# Patient Record
Sex: FEMALE | Race: BLACK OR AFRICAN AMERICAN | Employment: UNEMPLOYED
[De-identification: names, ages, dates, MRNs, and addresses within clinical notes are randomized per-mention and may not be internally consistent; named-entity substitution may affect disease eponyms.]

---

## 2017-09-24 ENCOUNTER — HEALTH MAINTENANCE LETTER (OUTPATIENT)
Age: 27
End: 2017-09-24

## 2018-10-01 ENCOUNTER — HEALTH MAINTENANCE LETTER (OUTPATIENT)
Age: 28
End: 2018-10-01

## 2021-08-10 ENCOUNTER — OFFICE VISIT (OUTPATIENT)
Dept: OBGYN | Facility: CLINIC | Age: 31
End: 2021-08-10
Payer: COMMERCIAL

## 2021-08-10 VITALS
SYSTOLIC BLOOD PRESSURE: 136 MMHG | OXYGEN SATURATION: 96 % | BODY MASS INDEX: 33.83 KG/M2 | WEIGHT: 191 LBS | HEART RATE: 85 BPM | DIASTOLIC BLOOD PRESSURE: 92 MMHG

## 2021-08-10 DIAGNOSIS — E28.2 PCOS (POLYCYSTIC OVARIAN SYNDROME): ICD-10-CM

## 2021-08-10 DIAGNOSIS — R10.2 PELVIC PAIN IN FEMALE: ICD-10-CM

## 2021-08-10 DIAGNOSIS — R30.0 DYSURIA: Primary | ICD-10-CM

## 2021-08-10 DIAGNOSIS — Z31.41 FERTILITY TESTING: ICD-10-CM

## 2021-08-10 DIAGNOSIS — N94.6 DYSMENORRHEA: ICD-10-CM

## 2021-08-10 LAB
ALBUMIN UR-MCNC: NEGATIVE MG/DL
APPEARANCE UR: CLEAR
BACTERIA #/AREA URNS HPF: ABNORMAL /HPF
BILIRUB UR QL STRIP: NEGATIVE
COLOR UR AUTO: YELLOW
ESTRADIOL SERPL-MCNC: 37 PG/ML
FSH SERPL-ACNC: 7.3 IU/L
GLUCOSE UR STRIP-MCNC: NEGATIVE MG/DL
HGB UR QL STRIP: ABNORMAL
KETONES UR STRIP-MCNC: NEGATIVE MG/DL
LEUKOCYTE ESTERASE UR QL STRIP: NEGATIVE
LH SERPL-ACNC: 21.3 IU/L
NITRATE UR QL: NEGATIVE
PH UR STRIP: 6 [PH] (ref 5–7)
PROLACTIN SERPL-MCNC: 34 UG/L (ref 3–27)
RBC #/AREA URNS AUTO: ABNORMAL /HPF
SP GR UR STRIP: 1.02 (ref 1–1.03)
SQUAMOUS #/AREA URNS AUTO: ABNORMAL /LPF
TSH SERPL DL<=0.005 MIU/L-ACNC: 0.66 MU/L (ref 0.4–4)
UROBILINOGEN UR STRIP-ACNC: 0.2 E.U./DL
WBC #/AREA URNS AUTO: ABNORMAL /HPF

## 2021-08-10 PROCEDURE — 83002 ASSAY OF GONADOTROPIN (LH): CPT | Performed by: OBSTETRICS & GYNECOLOGY

## 2021-08-10 PROCEDURE — 84443 ASSAY THYROID STIM HORMONE: CPT | Performed by: OBSTETRICS & GYNECOLOGY

## 2021-08-10 PROCEDURE — 87086 URINE CULTURE/COLONY COUNT: CPT | Performed by: OBSTETRICS & GYNECOLOGY

## 2021-08-10 PROCEDURE — 81001 URINALYSIS AUTO W/SCOPE: CPT | Performed by: OBSTETRICS & GYNECOLOGY

## 2021-08-10 PROCEDURE — 83001 ASSAY OF GONADOTROPIN (FSH): CPT | Performed by: OBSTETRICS & GYNECOLOGY

## 2021-08-10 PROCEDURE — 87186 SC STD MICRODIL/AGAR DIL: CPT | Performed by: OBSTETRICS & GYNECOLOGY

## 2021-08-10 PROCEDURE — 36415 COLL VENOUS BLD VENIPUNCTURE: CPT | Performed by: OBSTETRICS & GYNECOLOGY

## 2021-08-10 PROCEDURE — 84146 ASSAY OF PROLACTIN: CPT | Performed by: OBSTETRICS & GYNECOLOGY

## 2021-08-10 PROCEDURE — 82670 ASSAY OF TOTAL ESTRADIOL: CPT | Performed by: OBSTETRICS & GYNECOLOGY

## 2021-08-10 PROCEDURE — 99203 OFFICE O/P NEW LOW 30 MIN: CPT | Performed by: OBSTETRICS & GYNECOLOGY

## 2021-08-10 RX ORDER — PHENAZOPYRIDINE HYDROCHLORIDE 200 MG/1
200 TABLET, FILM COATED ORAL 3 TIMES DAILY PRN
Qty: 9 TABLET | Refills: 0 | Status: SHIPPED | OUTPATIENT
Start: 2021-08-10 | End: 2021-09-08

## 2021-08-10 RX ORDER — NITROFURANTOIN 25; 75 MG/1; MG/1
100 CAPSULE ORAL 2 TIMES DAILY
Qty: 14 CAPSULE | Refills: 0 | Status: SHIPPED | OUTPATIENT
Start: 2021-08-10 | End: 2021-09-08

## 2021-08-10 NOTE — PATIENT INSTRUCTIONS
If you have any questions regarding your visit, Please contact your care team.     [employer]: [phone]  [facility] CLINIC HOURS TELEPHONE NUMBER       Manuel Zuñiga M.D.    Ivana-MELISSA Sun-MELISSA Garcia-Medical Assistant    Radha-  Analilia-     Monday-[place]  8:00a.m-4:45 p.m  Tuesday-[place]  9:00a.m-4:00 p.m  Wednesday-[place] 8:00a.m-4:45 p.m.  Thursday-[place]  8:00a.m-4:45 p.m.  Friday-[place]  8:00a.m-4:45 p.m. [facility]  [address] [id number] [phone] ask for [facility] [place]  [phone] Fax  Imaging [phone]    [facility] Labor [facility] Delivery  [address]  [place], [place] [address] [phone]    [facility]  [address] [place]  [place] [address] [phone] ask [facility]  [phone] Fax  Imaging [phone]     Urgent Care locations:    [place]        [place] Monday-Friday  5 pm - 9 pm  Saturday and Sunday   9 am - 5 pm  Monday-Friday   11 am - 9 pm  Saturday and Sunday   9 am - 5 pm   [phone]   If you need a medication refill, please contact your pharmacy. Please allow 3 business days for your refill to be completed.  As always, Thank you for trusting us with your healthcare needs!

## 2021-08-13 LAB
BACTERIA UR CULT: ABNORMAL
BACTERIA UR CULT: ABNORMAL

## 2021-08-13 NOTE — PROGRESS NOTES
OB-GYN Problem-Oriented Visit or Consultation      Ifeoma Mahoney is a 31 year old year old P 0 who presents with a chief complaint of dysmenorrhea and possible UTI and fertility concerns.  Referred by self.  Patient's last menstrual period was 07/26/2021 (exact date).    Assessment:   Encounter Diagnoses   Name Primary?     Dysuria Yes     PCOS (polycystic ovarian syndrome)      Dysmenorrhea      Pelvic pain in female      Fertility testing          Plan and Recommendations: Begin empiric treatment for UTI though UA borderline. UC pending. Add Pyridium. Check pelvic ultrasound. D3 hormonal profile and later semen analysis and possible repeat HSG.   I reviewed the condition, causes, differential diagnosis, prognosis, evaluation and management considerations and options.  Questions answered and information given. See orders.   30 minutes spent on the date of encounter doing chart review, history, discussion with patient, and documentation, and further activities as noted, and review of appropriateness of decision-making for care, and review of test results, and interpretation of test results.       A/P:  Ifeoma was seen today for fertility.    Diagnoses and all orders for this visit:    Dysuria  -     UA with Microscopic reflex to Culture - lab collect; Future  -     UA with Microscopic reflex to Culture - lab collect  -     Urine Microscopic  -     nitroFURantoin macrocrystal-monohydrate (MACROBID) 100 MG capsule; Take 1 capsule (100 mg) by mouth 2 times daily  -     phenazopyridine (PYRIDIUM) 200 MG tablet; Take 1 tablet (200 mg) by mouth 3 times daily as needed for irritation  -     Urine Culture Aerobic Bacterial - lab collect; Future  -     Urine Culture Aerobic Bacterial - lab collect    PCOS (polycystic ovarian syndrome)  -     US Pelvic Complete with Transvaginal; Future    Dysmenorrhea  -     US Pelvic Complete with Transvaginal; Future    Pelvic pain in female  -     US Pelvic Complete with Transvaginal;  Future  -     Urine Culture Aerobic Bacterial - lab collect; Future  -     Urine Culture Aerobic Bacterial - lab collect    Fertility testing  -     Follicle stimulating hormone; Future  -     Lutropin; Future  -     Estradiol; Future  -     Prolactin; Future  -     TSH; Future  -     Follicle stimulating hormone  -     Lutropin  -     Estradiol  -     Prolactin  -     TSH        Manuel Zuñiga MD    HPI:     See prior notes. Used Clomid x 1 cycle then stopped infertility treatment and used OC until 1.5 yrs ago. Multiple ED visits including one possible recurrent PID. Menses q 28-30 days x 4-5 days. Attempting conception again. Last sex 1 week ago without dyspareunia. History of PCOS.     History of primary dysmenorrhea and has used Aleve or Tylenol with limited relief. LMP 7/26.    Had UTI treated 1 months ago at [facility]. Now has dysuria, suprapubic pain, cloudy strong urine. No fever.     Past medical, obstetrical, surgical, family and social history reviewed and as noted or updated in chart.     Allergies, meds and supplements are as noted or updated in chart.      ROS:   Systems reviewed include:  constitutional, gastrointestinal, genitourinary, musculoskeletal, integumentary, psychological, hematologic/lymphatic and endocrine.    These systems were negative for significant symptoms except for the following additional: none; see HPI.    EXAM:  VS as noted. BP (!) 136/92 (BP Location: Left arm, Patient Position: Chair, Cuff Size: Adult Regular)   Pulse 85   Wt 86.6 kg (191 lb)   LMP 07/26/2021 (Exact Date)   SpO2 96%   Breastfeeding No   BMI 33.83 kg/m    Constitutionally normal.     Exam not repeated at this time.    Manuel Zuñiga MD

## 2021-08-17 ENCOUNTER — TELEPHONE (OUTPATIENT)
Dept: OBGYN | Facility: CLINIC | Age: 31
End: 2021-08-17

## 2021-08-17 NOTE — TELEPHONE ENCOUNTER
Unable to reach patient via phone. Left message to call clinic back at [phone].    Corinne Pal RN

## 2021-08-17 NOTE — TELEPHONE ENCOUNTER
RN took call back from pt and relayed providers advisement. Pt took last dose of macrobid today.    Pt states her pain is increasing and asked about going to the ED. RN advised if her pain is unmanageable at home then she should be seen in the ED for further evaluation.    Patient verbalized understanding and agreed to plan.     Patient was given the opportunity to ask additional questions and have them answered. Patient had no further questions.     Ivana Toney RN on 8/17/2021 at 12:21 PM

## 2021-08-17 NOTE — TELEPHONE ENCOUNTER
[facility] Call Center    Phone Message    May a detailed message be left on voicemail: yes     Reason for Call: Patient called stating that she was speaking to a nurse yesterday regarding her pain and that they were supposed to send over an Rx to the pharmacy. Patient hasn't heard anything regarding this since. Please advise. Thank you.    Action Taken: Message routed to:  [facility] p [phone]    Travel Screening: Not Applicable

## 2021-08-17 NOTE — TELEPHONE ENCOUNTER
The Pyridium is only a short term treatment for dysuria with UTI. Advise completing the Macrobid for the UTI. If on-going symptoms and pain, then advise follow-up with FP/IM to recheck this or other causes for pain. If on-going dysuria despite clearing the UTI, then Urology consult may be needed to rule out stone, etc.      For dysmenorrhea specifically, need to wait to see what the pelvic ultrasound shows.      Please notify.  Manuel Zuñiga MD

## 2021-08-31 ENCOUNTER — PRENATAL OFFICE VISIT (OUTPATIENT)
Dept: OBGYN | Facility: CLINIC | Age: 31
End: 2021-08-31
Payer: COMMERCIAL

## 2021-08-31 DIAGNOSIS — Z23 NEED FOR TDAP VACCINATION: ICD-10-CM

## 2021-08-31 DIAGNOSIS — Z53.9 NO SHOW: Primary | ICD-10-CM

## 2021-08-31 RX ORDER — METRONIDAZOLE 500 MG/1
TABLET ORAL
COMMUNITY
Start: 2021-06-07 | End: 2021-09-08

## 2021-08-31 RX ORDER — PRENATAL VIT/IRON FUM/FOLIC AC 27MG-0.8MG
1 TABLET ORAL DAILY
COMMUNITY
End: 2022-02-24

## 2021-08-31 RX ORDER — FLUCONAZOLE 150 MG/1
TABLET ORAL
COMMUNITY
Start: 2021-06-07 | End: 2021-09-08

## 2021-08-31 NOTE — PROGRESS NOTES
RN called pt, pt then states she needed to take a call during the appt. RN called back 15 minutes later, pt asked to call back in 20 minutes. RN waited and called back in 20 minutes, pt did not answer. RN left message to return call to clinic to reschedule her appt.    Ivana Toney RN on 8/31/2021 at 3:40 PM    This patient was a no show for this scheduled appointment.

## 2021-09-07 ENCOUNTER — TELEPHONE (OUTPATIENT)
Dept: OBGYN | Facility: CLINIC | Age: 31
End: 2021-09-07

## 2021-09-07 NOTE — TELEPHONE ENCOUNTER
[facility] Call Center    Phone Message    May a detailed message be left on voicemail: yes     Reason for Call: Pt said that she has to see Dr. Zuñiga today or tomorrow.  He doesn't have any availability.  Please call Pt back to discuss.  Thanks.    Action Taken: Message routed to:  [facility] p [phone]    Travel Screening: Not Applicable

## 2021-09-07 NOTE — TELEPHONE ENCOUNTER
Spoke to patient, on day 2 of her cycle as of today, was having positive home pregnancy tests, now are negative. Wondering if she can have Clomid restarted again or if she will need to be seen again for an office visit. Please review and advise.  Ultrasound is scheduled for tomorrow as well    Leigh Momin MA

## 2021-09-08 ENCOUNTER — ANCILLARY PROCEDURE (OUTPATIENT)
Dept: ULTRASOUND IMAGING | Facility: CLINIC | Age: 31
End: 2021-09-08
Attending: OBSTETRICS & GYNECOLOGY
Payer: COMMERCIAL

## 2021-09-08 DIAGNOSIS — E28.2 PCOS (POLYCYSTIC OVARIAN SYNDROME): ICD-10-CM

## 2021-09-08 DIAGNOSIS — N94.6 DYSMENORRHEA: ICD-10-CM

## 2021-09-08 DIAGNOSIS — R10.2 PELVIC PAIN IN FEMALE: ICD-10-CM

## 2021-09-08 PROCEDURE — 76856 US EXAM PELVIC COMPLETE: CPT | Mod: 59 | Performed by: RADIOLOGY

## 2021-09-08 PROCEDURE — 76830 TRANSVAGINAL US NON-OB: CPT | Performed by: RADIOLOGY

## 2021-09-08 NOTE — TELEPHONE ENCOUNTER
Recommend awaiting pelvic ultrasound this cycle first before using clomiphene and advise semen analysis also. May require HSG. Please notify.   Manuel Zuñiga MD

## 2021-09-11 ENCOUNTER — OFFICE VISIT (OUTPATIENT)
Dept: URGENT CARE | Facility: URGENT CARE | Age: 31
End: 2021-09-11
Payer: COMMERCIAL

## 2021-09-11 ENCOUNTER — NURSE TRIAGE (OUTPATIENT)
Dept: NURSING | Facility: CLINIC | Age: 31
End: 2021-09-11

## 2021-09-11 VITALS
BODY MASS INDEX: 34.1 KG/M2 | TEMPERATURE: 98.7 F | SYSTOLIC BLOOD PRESSURE: 114 MMHG | DIASTOLIC BLOOD PRESSURE: 81 MMHG | HEART RATE: 87 BPM | OXYGEN SATURATION: 100 % | RESPIRATION RATE: 16 BRPM | WEIGHT: 192.5 LBS

## 2021-09-11 DIAGNOSIS — M79.641 PAIN OF RIGHT HAND: Primary | ICD-10-CM

## 2021-09-11 PROCEDURE — 99203 OFFICE O/P NEW LOW 30 MIN: CPT | Performed by: FAMILY MEDICINE

## 2021-09-11 RX ORDER — NAPROXEN 500 MG/1
500 TABLET ORAL 2 TIMES DAILY WITH MEALS
Qty: 30 TABLET | Refills: 0 | Status: SHIPPED | OUTPATIENT
Start: 2021-09-11 | End: 2021-09-27

## 2021-09-11 NOTE — TELEPHONE ENCOUNTER
Triage call   Patient called to report woke up with paain in  rt hand.  Hard to describe feels  Tight and numb. Pain constant stabbing pain.  She has been running water over it and it makes it feel a little better.  It woke her out of her sleep and has not gotten better.     Per protocol go to office now upgraded to urgent care Care advice given.  Verbalizes understanding and agrees with plan.    Salima Wright RN   [facility] Nurse Advisor  12:35 PM 9/11/2021    COVID 19 Nurse Triage Plan/Patient Instructions    Please be aware that novel coronavirus (COVID-19) may be circulating in the community. If you develop symptoms such as fever, cough, or SOB or if you have concerns about the presence of another infection including coronavirus (COVID-19), please contact your health care provider or visit [email or web address].     Disposition/Instructions    In-Person Visit with provider recommended. Reference Visit Selection Guide.    Thank you for taking steps to prevent the spread of this virus.  o Limit your contact with others.  o Wear a simple mask to cover your cough.  o Wash your hands well and often.    Resources    [facility]: About COVID-19: [email or web address]    CDC: What to Do If You're Sick: www.cdc.gov/coronavirus/2019-ncov/about/steps-when-sick.html    CDC: Ending Home Isolation: www.cdc.gov/coronavirus/2019-ncov/hcp/disposition-in-home-patients.html     CDC: Caring for Someone: www.cdc.gov/coronavirus/2019-ncov/if-you-are-sick/care-for-someone.html     [facility]: Interim Guidance for Hospital Discharge to Home: www.health.Pending sale to [facility].[place]/diseases/coronavirus/hcp/hospdischarge.pdf    [facility] clinical trials (COVID-19 research studies): [email or web address]/umn-[email or web address]-trials     Below are the COVID-19 hotlines at the [place] Department of Health ([facility]). Interpreters are available.   o For health questions: Call 047-397-4106 or 1-834.563.3230 (7 a.m. to 7  p.m.)  o For questions about schools and childcare: Call 036-630-3687 or 1-281.500.5782 (7 a.m. to 7 p.m.)                     Reason for Disposition    SEVERE pain (e.g., excruciating, unable to use hand at all)    Additional Information    Negative: Similar pain previously and it was from 'heart attack'    Negative: Similar pain previously from 'angina' and not relieved by nitroglycerin    Negative: Sounds like a life-threatening emergency to the triager    Negative: Fever and red area (or area very tender to touch)    Negative: Fever and swollen joint    Negative: Patient sounds very sick or weak to the triager    Protocols used: HAND AND WRIST PAIN-A-[place]

## 2021-09-11 NOTE — LETTER
September 11, 2021      Ifeoma Mahoney  [address] [address]        To Whom It May Concern:    Ifeoma Mahoney was seen in our clinic.  Kindly excuse her work absence for today and tomorrow.      Sincerely,        Zhang Correa MD

## 2021-09-11 NOTE — PROGRESS NOTES
Assessment & Plan     Pain of right hand  Differential discussed in detail including right hand sprain, carpal tunnel syndrome.  Patient deferred x-ray.  Suggested rest, icing, Tylenol and naproxen prescribed.  Wrist brace applied for support and stability.  Orthopedic referral placed.  All questions answered.  - naproxen (NAPROSYN) 500 MG tablet; Take 1 tablet (500 mg) by mouth 2 times daily (with meals)  - Orthopedic  Referral; Future         Tobacco Cessation:   reports that she has been smoking cigarettes. She has been smoking about 0.10 packs per day. She has never used smokeless tobacco.  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      Objective    /81 (BP Location: Left arm, Patient Position: Sitting, Cuff Size: Adult Large)   Pulse 87   Temp 98.7  F (37.1  C) (Oral)   Resp 16   Wt 87.3 kg (192 lb 8 oz)   LMP 07/26/2021 (Exact Date)   SpO2 100%   Breastfeeding No   BMI 34.10 kg/m    Body mass index is 34.1 kg/m .  Physical Exam   GENERAL: alert and no distress  NECK: no adenopathy, no asymmetry, masses, or scars and thyroid normal to palpation  RESP: lungs clear to auscultation - no rales, rhonchi or wheezes  CV: regular rate and rhythm, normal S1 S2, no S3 or S4, no murmur, click or rub, no peripheral edema and peripheral  pulses strong  MS: Mildly tender right wrist and dorsal hand, no joint swelling, skin discoloration noted, mildly weak handgrip, normal capillary refill, pedal pulses 3+, sensation to touch and pressure intact  NEURO: Normal strength and tone, mentation intact and speech normal  PSYCH: mentation appears normal, affect normal/bright

## 2021-09-14 ENCOUNTER — OFFICE VISIT (OUTPATIENT)
Dept: ORTHOPEDICS | Facility: CLINIC | Age: 31
End: 2021-09-14
Payer: COMMERCIAL

## 2021-09-14 VITALS — SYSTOLIC BLOOD PRESSURE: 122 MMHG | HEART RATE: 86 BPM | OXYGEN SATURATION: 100 % | DIASTOLIC BLOOD PRESSURE: 77 MMHG

## 2021-09-14 DIAGNOSIS — G56.01 CARPAL TUNNEL SYNDROME ON RIGHT: Primary | ICD-10-CM

## 2021-09-14 DIAGNOSIS — M79.641 PAIN OF RIGHT HAND: ICD-10-CM

## 2021-09-14 PROCEDURE — 99203 OFFICE O/P NEW LOW 30 MIN: CPT | Performed by: ORTHOPAEDIC SURGERY

## 2021-09-14 RX ORDER — METHYLPREDNISOLONE 4 MG
TABLET, DOSE PACK ORAL
Qty: 21 TABLET | Refills: 0 | Status: SHIPPED | OUTPATIENT
Start: 2021-09-14 | End: 2021-09-27

## 2021-09-14 RX ORDER — GABAPENTIN 300 MG/1
300 CAPSULE ORAL
Qty: 30 CAPSULE | Refills: 0 | Status: SHIPPED | OUTPATIENT
Start: 2021-09-14 | End: 2021-09-28

## 2021-09-14 ASSESSMENT — PAIN SCALES - GENERAL: PAINLEVEL: SEVERE PAIN (6)

## 2021-09-14 NOTE — PROGRESS NOTES
SUBJECTIVE:   Ifeoma Mahoney is a 31 year old female who is seen in consultation at the request of Dr. Correa for evaluation of right  hand pain that has been present approximately 1 week. No known injury.    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    Symptoms: pain palm.  Has numbness and tingling inRIGHT  #3 and #4 digits.  Other symptoms include pain driving, difficulty gripping steering wheel. Hand is very sensitive.  No neck problems.   Doesn't want anyone to touch the hand.      Also reports sensitive dorsum LEFT hand when anything rubs on it x 1 week.      Treatment: wrist brace no help. Nsaids,     Job description:  now.     Past Medical History:   Diagnosis Date     Asthma, chronic 1995     Chlamydia 2003     Dysmenorrhea 2002    chronic      Impaired glucose tolerance 2009     Infertility, female 2012     Intermittent asthma 5/24/2013     PCOS (polycystic ovarian syndrome)      PID (acute pelvic inflammatory disease) 2010 2012 HSG showed bilateral patent tubes     Tobacco use disorder       Past Surgical History:   Procedure Laterality Date     DILATION AND CURETTAGE  9/4/2012    benign     LAPAROSCOPY DIAGNOSTIC (GYN)  9/4/2012    neg        REVIEW OF SYSTEMS:  CONSTITUTIONAL:  NEGATIVE for fever, chills, change in weight  INTEGUMENTARY/SKIN:  NEGATIVE for worrisome rashes, moles or lesions  EYES:  NEGATIVE for vision changes or irritation  ENT/MOUTH:  NEGATIVE for ear, mouth and throat problems  RESP:  NEGATIVE for significant cough or SOB  BREAST:  NEGATIVE for masses, tenderness or discharge  CV:  NEGATIVE for chest pain, palpitations or peripheral edema  GI:  NEGATIVE for nausea, abdominal pain, heartburn, or change in bowel habits  :   Negative   MUSCULOSKELETAL:  See HPI above  NEURO:  See HPI above  ENDOCRINE:  NEGATIVE for temperature intolerance, skin/hair changes  HEME/ALLERGY/IMMUNE:  NEGATIVE for bleeding problems  PSYCHIATRIC:  NEGATIVE for changes in mood or affect    EXAM:  GENERAL APPEARANCE: healthy, alert and no distress   GAIT: NORMAL  PSYCH:  mentation appears normal and affect normal/bright  SKIN: no suspicious lesions or rashes  NEURO: reflexes normal in upper extremities.   Vascular: Good capp refill and pulses  RESP: No increased work of breathing  LYMPH:  No lymphedema    MSK/Neuro:   strength: decreased,   negative thenar fasciculations.  Thenar atrophy:none.   Sensation diminished in  @2,3,4 right  digits,     Range of Motion wrist, digits: All Normal  Special tests: Tinel's positive, Phalen's positive.  + tinels over the dorsum of hand at base of 3rd finger    EMG: none.    ASSESSMENT/PLAN  Right, acute Carpal tunnel syndrome   Also some neuritis on the dorsum of the left hand.    We talked about the options, which included  EMG, activity modification, night splinting, therapy, corticosteroid injection, medrol dose pack, and carpal tunnel release.      Since the symptoms are acute, we decided to proceed with oral steroids. A medrol dose pack was ordered..     She was very worried about pain relief. Gabapentin prescription written as well 300mg at bedtime. Refills or dose modification per her primary care provider.    She would also like an appointment for an image guided corticosteroid injection as well in the right carpal tunnel.  This was ordered.  She is desperate for a quick fix for the present problem.     Return to clinic as needed     MARTHA Guzman MD  Dept. Orthopedic Surgery  [facility]

## 2021-09-14 NOTE — NURSING NOTE
Ready to quit: No  Counseling given: Yes  Comment: smokes 7 cigarettes per day, interested in quitting    Miguel Ángel THOMAS

## 2021-09-14 NOTE — LETTER
9/14/2021         RE: Ifeoma Mahoney  [address] [address]        Dear Colleague,    Thank you for referring your patient, Ifeoma Mahoney, to the [facility]. Please see a copy of my visit note below.      SUBJECTIVE:   Ifeoma Mahoney is a 31 year old female who is seen in consultation at the request of Dr. Correa for evaluation of right  hand pain that has been present approximately 1 week. No known injury.    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    Symptoms: pain palm.  Has numbness and tingling inRIGHT  #3 and #4 digits.  Other symptoms include pain driving, difficulty gripping steering wheel. Hand is very sensitive.  No neck problems.   Doesn't want anyone to touch the hand.      Also reports sensitive dorsum LEFT hand when anything rubs on it x 1 week.      Treatment: wrist brace no help. Nsaids,     Job description:  now.     Past Medical History:   Diagnosis Date     Asthma, chronic 1995     Chlamydia 2003     Dysmenorrhea 2002    chronic      Impaired glucose tolerance 2009     Infertility, female 2012     Intermittent asthma 5/24/2013     PCOS (polycystic ovarian syndrome)      PID (acute pelvic inflammatory disease) 2010 2012 HSG showed bilateral patent tubes     Tobacco use disorder       Past Surgical History:   Procedure Laterality Date     DILATION AND CURETTAGE  9/4/2012    benign     LAPAROSCOPY DIAGNOSTIC (GYN)  9/4/2012    neg        REVIEW OF SYSTEMS:  CONSTITUTIONAL:  NEGATIVE for fever, chills, change in weight  INTEGUMENTARY/SKIN:  NEGATIVE for worrisome rashes, moles or lesions  EYES:  NEGATIVE for vision changes or irritation  ENT/MOUTH:  NEGATIVE for ear, mouth and throat  problems  RESP:  NEGATIVE for significant cough or SOB  BREAST:  NEGATIVE for masses, tenderness or discharge  CV:  NEGATIVE for chest pain, palpitations or peripheral edema  GI:  NEGATIVE for nausea, abdominal pain, heartburn, or change in bowel habits  :  Negative   MUSCULOSKELETAL:  See HPI above  NEURO:  See HPI above  ENDOCRINE:  NEGATIVE for temperature intolerance, skin/hair changes  HEME/ALLERGY/IMMUNE:  NEGATIVE for bleeding problems  PSYCHIATRIC:  NEGATIVE for changes in mood or affect    EXAM:  GENERAL APPEARANCE: healthy, alert and no distress   GAIT: NORMAL  PSYCH:  mentation appears normal and affect normal/bright  SKIN: no suspicious lesions or rashes  NEURO: reflexes normal in upper extremities.   Vascular: Good capp refill and pulses  RESP: No increased work of breathing  LYMPH:  No lymphedema    MSK/Neuro:   strength: decreased,   negative thenar fasciculations.  Thenar atrophy:none.   Sensation diminished in  @2,3,4 right  digits,     Range of Motion wrist, digits: All Normal  Special tests: Tinel's positive, Phalen's positive.  + tinels over the dorsum of hand at base of 3rd finger    EMG: none.    ASSESSMENT/PLAN  Right, acute Carpal tunnel syndrome   Also some neuritis on the dorsum of the left hand.    We talked about the options, which included  EMG, activity modification, night splinting, therapy, corticosteroid injection, medrol dose pack, and carpal tunnel release.      Since the symptoms are acute, we decided to proceed with oral steroids. A medrol dose pack was ordered..     She was very worried about pain relief. Gabapentin prescription written as well 300mg at bedtime. Refills or dose modification per her primary care provider.    She would also like an appointment for an image guided corticosteroid injection as well in the right carpal tunnel.  This was ordered.  She is desperate for a quick fix for the present problem.     Return to clinic as needed     MARTHA Guzman  MD  Dept. Orthopedic Surgery  [facility]      Again, thank you for allowing me to participate in the care of your patient.        Sincerely,        Derek Guzman MD

## 2021-09-16 ENCOUNTER — NURSE TRIAGE (OUTPATIENT)
Dept: FAMILY MEDICINE | Facility: CLINIC | Age: 31
End: 2021-09-16

## 2021-09-16 NOTE — TELEPHONE ENCOUNTER
"  Reason for Disposition    Side (flank) or lower back pain present    SEVERE pain with urination    Additional Information    Negative: Unable to urinate (or only a few drops) and bladder feels very full    Negative: Vomiting    Negative: Patient sounds very sick or weak to the triager    Answer Assessment - Initial Assessment Questions  1. SEVERITY: \"How bad is the pain?\"  (e.g., Scale 1-10; mild, moderate, or severe)    - MILD (1-3): complains slightly about urination hurting    - MODERATE (4-7): interferes with normal activities      - SEVERE (8-10): excruciating, unwilling or unable to urinate because of the pain       Moderate-severe  2. FREQUENCY: \"How many times have you had painful urination today?\"       6 times an hour   3. PATTERN: \"Is pain present every time you urinate or just sometimes?\"       Constant   4. ONSET: \"When did the painful urination start?\"       1 month ago, received treatment but symptoms did not go away completely  5. FEVER: \"Do you have a fever?\" If so, ask: \"What is your temperature, how was it measured, and when did it start?\"      Denies   6. PAST UTI: \"Have you had a urine infection before?\" If so, ask: \"When was the last time?\" and \"What happened that time?\"       Yes, 1 month ago  7. CAUSE: \"What do you think is causing the painful urination?\"  (e.g., UTI, scratch, Herpes sore)      UTI  8. OTHER SYMPTOMS: \"Do you have any other symptoms?\" (e.g., flank pain, vaginal discharge, genital sores, urgency, blood in urine)      Cloudy urine, foul smelling urine, lower back pain, vaginal discharge  9. PREGNANCY: \"Is there any chance you are pregnant?\" \"When was your last menstrual period?\"      Going through fertility treatment. Not sure when her LMP was    Protocols used: URINATION PAIN - FEMALE-A-[place]    "

## 2021-09-27 ENCOUNTER — OFFICE VISIT (OUTPATIENT)
Dept: FAMILY MEDICINE | Facility: CLINIC | Age: 31
End: 2021-09-27
Payer: COMMERCIAL

## 2021-09-27 VITALS
RESPIRATION RATE: 16 BRPM | BODY MASS INDEX: 34.9 KG/M2 | SYSTOLIC BLOOD PRESSURE: 126 MMHG | OXYGEN SATURATION: 99 % | DIASTOLIC BLOOD PRESSURE: 80 MMHG | TEMPERATURE: 97.5 F | HEART RATE: 98 BPM | WEIGHT: 197 LBS

## 2021-09-27 DIAGNOSIS — G56.01 CARPAL TUNNEL SYNDROME OF RIGHT WRIST: Primary | ICD-10-CM

## 2021-09-27 DIAGNOSIS — R30.0 DYSURIA: ICD-10-CM

## 2021-09-27 DIAGNOSIS — O03.9 MISCARRIAGE: ICD-10-CM

## 2021-09-27 DIAGNOSIS — N83.201 CYST OF RIGHT OVARY: ICD-10-CM

## 2021-09-27 PROCEDURE — 99214 OFFICE O/P EST MOD 30 MIN: CPT | Performed by: FAMILY MEDICINE

## 2021-09-27 RX ORDER — OXYCODONE HYDROCHLORIDE 5 MG/1
5 TABLET ORAL EVERY 6 HOURS PRN
Qty: 12 TABLET | Refills: 0 | Status: SHIPPED | OUTPATIENT
Start: 2021-09-27 | End: 2021-10-05

## 2021-09-27 RX ORDER — DICLOFENAC SODIUM 75 MG/1
75 TABLET, DELAYED RELEASE ORAL 2 TIMES DAILY
Qty: 30 TABLET | Refills: 1 | Status: SHIPPED | OUTPATIENT
Start: 2021-09-27 | End: 2021-10-26

## 2021-09-27 RX ORDER — CIPROFLOXACIN 250 MG/1
250 TABLET, FILM COATED ORAL 2 TIMES DAILY
Qty: 14 TABLET | Refills: 0 | Status: SHIPPED | OUTPATIENT
Start: 2021-09-27 | End: 2021-10-11

## 2021-09-27 ASSESSMENT — ANXIETY QUESTIONNAIRES
7. FEELING AFRAID AS IF SOMETHING AWFUL MIGHT HAPPEN: NOT AT ALL
1. FEELING NERVOUS, ANXIOUS, OR ON EDGE: SEVERAL DAYS
GAD7 TOTAL SCORE: 7
4. TROUBLE RELAXING: MORE THAN HALF THE DAYS
7. FEELING AFRAID AS IF SOMETHING AWFUL MIGHT HAPPEN: NOT AT ALL
8. IF YOU CHECKED OFF ANY PROBLEMS, HOW DIFFICULT HAVE THESE MADE IT FOR YOU TO DO YOUR WORK, TAKE CARE OF THINGS AT HOME, OR GET ALONG WITH OTHER PEOPLE?: SOMEWHAT DIFFICULT
3. WORRYING TOO MUCH ABOUT DIFFERENT THINGS: SEVERAL DAYS
6. BECOMING EASILY ANNOYED OR IRRITABLE: SEVERAL DAYS
GAD7 TOTAL SCORE: 7
GAD7 TOTAL SCORE: 7
2. NOT BEING ABLE TO STOP OR CONTROL WORRYING: SEVERAL DAYS
5. BEING SO RESTLESS THAT IT IS HARD TO SIT STILL: SEVERAL DAYS

## 2021-09-27 ASSESSMENT — PATIENT HEALTH QUESTIONNAIRE - PHQ9
10. IF YOU CHECKED OFF ANY PROBLEMS, HOW DIFFICULT HAVE THESE PROBLEMS MADE IT FOR YOU TO DO YOUR WORK, TAKE CARE OF THINGS AT HOME, OR GET ALONG WITH OTHER PEOPLE: SOMEWHAT DIFFICULT
SUM OF ALL RESPONSES TO PHQ QUESTIONS 1-9: 7
SUM OF ALL RESPONSES TO PHQ QUESTIONS 1-9: 7

## 2021-09-27 NOTE — PROGRESS NOTES
Answers for HPI/ROS submitted by the patient on 9/27/2021  If you checked off any problems, how difficult have these problems made it for you to do your work, take care of things at home, or get along with other people?: Somewhat difficult  PHQ9 TOTAL SCORE: 7  HEMANT 7 TOTAL SCORE: 7        Assessment & Plan     Carpal tunnel syndrome of right wrist  Patient with right hand carpal tunnel and has been working with Dr. Guzman.  Likely to have an ultrasound-guided cortisone injection soon.  She does think the gabapentin has helped as well.  We will add scheduled oral Voltaren and she can try some topical Voltaren.  Short-term use of pain medication.  - diclofenac (VOLTAREN) 75 MG EC tablet; Take 1 tablet (75 mg) by mouth 2 times daily  - oxyCODONE (ROXICODONE) 5 MG tablet; Take 1 tablet (5 mg) by mouth every 6 hours as needed for pain    Dysuria  Notes urinary symptoms for the past week or so.  Has been trying to flush it out.  Fits with urinary infections we will go ahead and treat empirically.  - ciprofloxacin (CIPRO) 250 MG tablet; Take 1 tablet (250 mg) by mouth 2 times daily for 7 days    Cyst of right ovary  Recent abdominal cramping and bleeding was found to have a miscarriage.  Ultrasound at the time showed a hemorrhagic right ovarian cyst.  Discussed potential follow-up on this in the future likely related to her pregnancy.  - oxyCODONE (ROXICODONE) 5 MG tablet; Take 1 tablet (5 mg) by mouth every 6 hours as needed for pain    Miscarriage  As above.  Also recommended keeping up with her regular GYN provider.      See Patient Instructions    Return in about 1 week (around 10/4/2021) for Contact me with progress, phone visit.    Jumana Eaton MD  [facility]   Ifeoma is a 31 year old who presents for the following health issues     HPI     Visit today with patient to discuss a variety of things have been going on recently.  Reviewed interval history in her chart and  with her.  Under treatment for carpal tunnel.  Recently had some abdominal cramping and bleeding and was found to have a miscarriage.  Think she is dealing with a urinary infection for the past week or so as well.    Review of Systems   Constitutional, HEENT, cardiovascular, pulmonary, gi and gu systems are negative, except as otherwise noted.      Objective    LMP 07/26/2021 (Exact Date)   There is no height or weight on file to calculate BMI.  Physical Exam   Alert, pleasant, upbeat, and in no apparent discomfort.  S1 and S2 normal, no murmurs, clicks, gallops or rubs. Regular rate and rhythm. Chest is clear; no wheezes or rales. No edema or JVD.  The abdomen is soft without tenderness, guarding, mass, rebound or organomegaly. Bowel sounds are normal. No CVA tenderness or inguinal adenopathy noted.   Past labs reviewed with the patient.

## 2021-09-28 ENCOUNTER — TELEPHONE (OUTPATIENT)
Dept: FAMILY MEDICINE | Facility: CLINIC | Age: 31
End: 2021-09-28

## 2021-09-28 DIAGNOSIS — G47.00 INSOMNIA, UNSPECIFIED TYPE: Primary | ICD-10-CM

## 2021-09-28 DIAGNOSIS — M79.641 PAIN OF RIGHT HAND: ICD-10-CM

## 2021-09-28 RX ORDER — TRAZODONE HYDROCHLORIDE 50 MG/1
50 TABLET, FILM COATED ORAL
Qty: 30 TABLET | Refills: 0 | Status: SHIPPED | OUTPATIENT
Start: 2021-09-28 | End: 2021-11-02

## 2021-09-28 RX ORDER — GABAPENTIN 300 MG/1
300 CAPSULE ORAL
Qty: 30 CAPSULE | Refills: 0 | Status: SHIPPED | OUTPATIENT
Start: 2021-09-28 | End: 2021-11-02

## 2021-09-28 ASSESSMENT — ASTHMA QUESTIONNAIRES: ACT_TOTALSCORE: 21

## 2021-09-28 ASSESSMENT — PATIENT HEALTH QUESTIONNAIRE - PHQ9: SUM OF ALL RESPONSES TO PHQ QUESTIONS 1-9: 7

## 2021-09-28 ASSESSMENT — ANXIETY QUESTIONNAIRES: GAD7 TOTAL SCORE: 7

## 2021-09-28 NOTE — TELEPHONE ENCOUNTER
Patient had an appointment yesterday. She said she needs a refill on her gabapentin. Also, a sleeping medication was discussed. Can you send something in?Sabra Mccullough

## 2021-10-05 DIAGNOSIS — N83.201 CYST OF RIGHT OVARY: ICD-10-CM

## 2021-10-05 DIAGNOSIS — G56.01 CARPAL TUNNEL SYNDROME OF RIGHT WRIST: ICD-10-CM

## 2021-10-05 RX ORDER — OXYCODONE HYDROCHLORIDE 5 MG/1
5 TABLET ORAL EVERY 6 HOURS PRN
Qty: 12 TABLET | Refills: 0 | Status: SHIPPED | OUTPATIENT
Start: 2021-10-05 | End: 2022-02-24

## 2021-10-05 NOTE — TELEPHONE ENCOUNTER
Called patient back said the gabapentin and trazodone were both sent to her pharmacy on 9/28 and should be ready for her pick-up and she also wanted to get the roxicodone refilled.     Ritu Fulton RN

## 2021-10-05 NOTE — TELEPHONE ENCOUNTER
Routing refill request to provider for review/approval because:  Drug not on the [facility] refill protocol     Has a virtual appointment with you on 10/8 to discuss current pain medication regimen    Ritu Fulton RN

## 2021-10-05 NOTE — TELEPHONE ENCOUNTER
Reason for Call:  Medication or medication refill:    Do you use a [facility]?  Name of the pharmacy and phone number for the current request:  [facility][id number] - [facility] [address] AT [address]    Name of the medication requested:    gabapentin (NEURONTIN) 300 MG capsule   traZODone (DESYREL) 50 MG tablet  And percocet-- this does not seem to be in medications on pts chart.       Other request:   Can we leave a detailed message on this number? YES    Phone number patient can be reached at: Home number on file [phone] (home)    Best Time: any    Call taken on 10/5/2021 at 12:18 PM by Lise Lloyd

## 2021-10-11 DIAGNOSIS — G56.01 CARPAL TUNNEL SYNDROME OF RIGHT WRIST: ICD-10-CM

## 2021-10-11 DIAGNOSIS — R30.0 DYSURIA: ICD-10-CM

## 2021-10-11 DIAGNOSIS — N83.201 CYST OF RIGHT OVARY: ICD-10-CM

## 2021-10-11 RX ORDER — CIPROFLOXACIN 250 MG/1
250 TABLET, FILM COATED ORAL 2 TIMES DAILY
Qty: 14 TABLET | Refills: 0 | Status: SHIPPED | OUTPATIENT
Start: 2021-10-11 | End: 2022-02-24

## 2021-10-11 RX ORDER — OXYCODONE HYDROCHLORIDE 5 MG/1
5 TABLET ORAL EVERY 6 HOURS PRN
Qty: 12 TABLET | Refills: 0 | Status: CANCELLED | OUTPATIENT
Start: 2021-10-11

## 2021-10-11 NOTE — TELEPHONE ENCOUNTER
I will send in another course of antibiotics.  Not longer, just the same.  This should be more than enough to cure her urinary infection or she needs to be seen and evaluated.    I will not refill the pain medication.  This is not meant for ongoing use.

## 2021-10-11 NOTE — TELEPHONE ENCOUNTER
1) pt was treated on 9/27/21 for a UTI with Cipro, she finished antibiotic and was improving but sx did not  Completely resolve and are worsening again. Pt asking for a longer course of the antibiotic. Can you refill it?    2) pt requesting refill of oxycodone.  Routing refill request to provider for review/approval because:  Drug not on the [facility] refill protocol   Yary SANABRIAN, RN

## 2021-10-13 NOTE — TELEPHONE ENCOUNTER
Pt notified of provider message as written.  Pt verbalized good understanding.  Yary Jacobson BSN, RN

## 2021-10-24 DIAGNOSIS — G56.01 CARPAL TUNNEL SYNDROME OF RIGHT WRIST: ICD-10-CM

## 2021-10-26 RX ORDER — DICLOFENAC SODIUM 75 MG/1
TABLET, DELAYED RELEASE ORAL
Qty: 30 TABLET | Refills: 1 | Status: SHIPPED | OUTPATIENT
Start: 2021-10-26 | End: 2022-02-24

## 2021-10-26 NOTE — TELEPHONE ENCOUNTER
"Requested Prescriptions   Pending Prescriptions Disp Refills     diclofenac (VOLTAREN) 75 MG EC tablet [Pharmacy Med Name: DICLOFENAC SODIUM 75MG DR TABLETS] 30 tablet 1     Sig: TAKE 1 TABLET(75 MG) BY MOUTH TWICE DAILY       NSAID Medications Failed - 10/24/2021  4:02 AM        Failed - Normal ALT on file in past 12 months     No lab results found.          Failed - Normal AST on file in past 12 months     No lab results found.          Failed - Normal CBC on file in past 12 months     No lab results found.              Failed - Normal serum creatinine on file in past 12 months     No lab results found.    Ok to refill medication if creatinine is low          Passed - Blood pressure under 140/90 in past 12 months     BP Readings from Last 3 Encounters:   09/27/21 126/80   09/14/21 122/77   09/11/21 114/81                 Passed - Recent (12 mo) or future (30 days) visit within the authorizing provider's specialty     Patient has had an office visit with the authorizing provider or a provider within the authorizing providers department within the previous 12 mos or has a future within next 30 days. See \"Patient Info\" tab in inbasket, or \"Choose Columns\" in Meds & Orders section of the refill encounter.              Passed - Patient is age 6-64 years        Passed - Medication is active on med list        Passed - No active pregnancy on record        Passed - No positive pregnancy test in past 12 months           Izabela URIAS, RN    "

## 2021-11-01 DIAGNOSIS — M79.641 PAIN OF RIGHT HAND: ICD-10-CM

## 2021-11-01 DIAGNOSIS — N83.201 CYST OF RIGHT OVARY: ICD-10-CM

## 2021-11-01 DIAGNOSIS — G47.00 INSOMNIA, UNSPECIFIED TYPE: ICD-10-CM

## 2021-11-01 DIAGNOSIS — G56.01 CARPAL TUNNEL SYNDROME OF RIGHT WRIST: ICD-10-CM

## 2021-11-02 RX ORDER — OXYCODONE HYDROCHLORIDE 5 MG/1
5 TABLET ORAL EVERY 6 HOURS PRN
Qty: 12 TABLET | Refills: 0 | OUTPATIENT
Start: 2021-11-02

## 2021-11-02 RX ORDER — GABAPENTIN 300 MG/1
300 CAPSULE ORAL
Qty: 30 CAPSULE | Refills: 0 | Status: SHIPPED | OUTPATIENT
Start: 2021-11-02 | End: 2021-11-08

## 2021-11-02 RX ORDER — TRAZODONE HYDROCHLORIDE 50 MG/1
TABLET, FILM COATED ORAL
Qty: 30 TABLET | Refills: 11 | Status: SHIPPED | OUTPATIENT
Start: 2021-11-02 | End: 2023-03-27

## 2021-11-02 NOTE — TELEPHONE ENCOUNTER
Patient calling as also needing refills for Gabapentin and Oxycodone    Patient would like a call when this is done      Ines SANABRIAN, RN

## 2021-11-02 NOTE — TELEPHONE ENCOUNTER
I will refill the gabapentin but not the oxycodone.  This is not meant to be an ongoing prescription so if her symptoms are enough that she would require narcotic pain medication she needs to have some type of visit.

## 2021-11-08 DIAGNOSIS — M79.641 PAIN OF RIGHT HAND: ICD-10-CM

## 2021-11-08 RX ORDER — GABAPENTIN 300 MG/1
CAPSULE ORAL
Qty: 30 CAPSULE | Refills: 0 | Status: SHIPPED | OUTPATIENT
Start: 2021-11-08 | End: 2022-01-19

## 2022-01-10 ENCOUNTER — TELEPHONE (OUTPATIENT)
Dept: FAMILY MEDICINE | Facility: CLINIC | Age: 32
End: 2022-01-10
Payer: COMMERCIAL

## 2022-01-10 NOTE — TELEPHONE ENCOUNTER
Patient called stating she is in pain and needs a refill on pain medication early. I sent her call to the RN pool for them to advise.

## 2022-01-19 DIAGNOSIS — M79.641 PAIN OF RIGHT HAND: ICD-10-CM

## 2022-01-19 RX ORDER — GABAPENTIN 300 MG/1
CAPSULE ORAL
Qty: 30 CAPSULE | Refills: 0 | Status: SHIPPED | OUTPATIENT
Start: 2022-01-19 | End: 2022-02-24

## 2022-01-19 NOTE — TELEPHONE ENCOUNTER
Routing refill request to provider for review/approval because:  Drug not on the [facility] refill protocol   Yary Jacobson BSN, RN

## 2022-02-16 ENCOUNTER — TELEPHONE (OUTPATIENT)
Dept: OBGYN | Facility: CLINIC | Age: 32
End: 2022-02-16
Payer: COMMERCIAL

## 2022-02-16 NOTE — TELEPHONE ENCOUNTER
"Pt has not been seen by Dr. Zuñiga since 8/10/21.  Per OV notes:  \"Check pelvic ultrasound. D3 hormonal profile and later semen analysis and possible repeat HSG.\"    Per 9/8/21 US result note:  \"May meet for short visit to review and prescribe clomiphene if desired. May defer semen analysis and probably defer repeat HSG for now.\"  Pt did not have a visit with Dr. Zuñiga to discuss Clomid use.    Spoke with pt and advised her to schedule an appt with Dr. Zuñiga to discuss Clomid.  She states Dr. Zuñiga has prescribed this for her in the past, about 2 years ago.  She does have an appt with him but not until 3/17 and she is hoping to start with next cycle which should be starting around 3/1.     Routing to Dr. Zuñiga to see if he is willing to prescribe Clomid for pt again before he sees her in clinic on 3/17.  Pt is aware Dr. Zuñiga is not back in clinic until 3/22 and is willing to wait to hear back from him at that time.    Corinne Pal RN        "

## 2022-02-16 NOTE — TELEPHONE ENCOUNTER
[facility] Call Center    Phone Message    May a detailed message be left on voicemail: yes     Reason for Call: Medication Question or concern regarding medication   Prescription Clarification  Name of Medication: Clomid or another medication he recommends  Prescribing Provider: Yogesh   Pharmacy:    [facility][id number] - [place], [place] - [address] [facility]    What on the order needs clarification? The patient called asking if she can get this medication filled before her next cycle, due around ~3/1/22. Please advise. Thank you.     Action Taken: Message routed to:  [phone]    Travel Screening: Not Applicable

## 2022-02-23 NOTE — TELEPHONE ENCOUNTER
Notified pt of Dr. Zuñiga' recommendations below.    Pt verbalized understanding and agreed to plan.    Corinne Pal RN

## 2022-02-23 NOTE — TELEPHONE ENCOUNTER
Noted last visit 08/2021 and ultrasound following that. Reviewed chart and I noted that patient had used clomiphene for two months in 2011, prescribed at [facility]. Recommend keeping the appointment as planned to review her condition and clomiphene treatment details and management options prior to starting now. Please notify.     Manuel Zuñiga MD

## 2022-02-24 ENCOUNTER — OFFICE VISIT (OUTPATIENT)
Dept: ORTHOPEDICS | Facility: CLINIC | Age: 32
End: 2022-02-24
Payer: COMMERCIAL

## 2022-02-24 ENCOUNTER — OFFICE VISIT (OUTPATIENT)
Dept: ORTHOPEDICS | Facility: CLINIC | Age: 32
End: 2022-02-24
Attending: FAMILY MEDICINE
Payer: COMMERCIAL

## 2022-02-24 ENCOUNTER — PREP FOR PROCEDURE (OUTPATIENT)
Dept: ORTHOPEDICS | Facility: CLINIC | Age: 32
End: 2022-02-24

## 2022-02-24 VITALS
HEIGHT: 63 IN | BODY MASS INDEX: 37.21 KG/M2 | DIASTOLIC BLOOD PRESSURE: 82 MMHG | HEART RATE: 90 BPM | WEIGHT: 210 LBS | OXYGEN SATURATION: 96 % | SYSTOLIC BLOOD PRESSURE: 132 MMHG

## 2022-02-24 VITALS
BODY MASS INDEX: 37.21 KG/M2 | HEIGHT: 63 IN | WEIGHT: 210 LBS | DIASTOLIC BLOOD PRESSURE: 64 MMHG | SYSTOLIC BLOOD PRESSURE: 110 MMHG

## 2022-02-24 DIAGNOSIS — G56.01 CARPAL TUNNEL SYNDROME OF RIGHT WRIST: ICD-10-CM

## 2022-02-24 DIAGNOSIS — G56.01 CARPAL TUNNEL SYNDROME OF RIGHT WRIST: Primary | ICD-10-CM

## 2022-02-24 DIAGNOSIS — M79.641 PAIN OF RIGHT HAND: ICD-10-CM

## 2022-02-24 DIAGNOSIS — G56.01 RIGHT CARPAL TUNNEL SYNDROME: Primary | ICD-10-CM

## 2022-02-24 PROCEDURE — 99213 OFFICE O/P EST LOW 20 MIN: CPT | Performed by: ORTHOPAEDIC SURGERY

## 2022-02-24 PROCEDURE — 99203 OFFICE O/P NEW LOW 30 MIN: CPT | Performed by: FAMILY MEDICINE

## 2022-02-24 RX ORDER — GABAPENTIN 300 MG/1
300 CAPSULE ORAL 2 TIMES DAILY
Qty: 60 CAPSULE | Refills: 0 | Status: SHIPPED | OUTPATIENT
Start: 2022-02-24 | End: 2022-05-24

## 2022-02-24 ASSESSMENT — PAIN SCALES - GENERAL: PAINLEVEL: WORST PAIN (10)

## 2022-02-24 NOTE — PROGRESS NOTES
SUBJECTIVE:  Ifeoma Mahoney is a 31 year old female who is seen urgently in consultation at the request of Dr. Eduardo, for Right hand problems x 5 months.  She has had some issues x years, but got worse 5 months ago. Used to be only at night or the morning.  Symptoms: pain.  Has numbness and tingling in all digits.  Other symptoms include severe pain in fingers and in palm..     She notes pain that radiates into her 3rd and 4th digits. She has noted some occasional discomfort and a feeling of tightness involving her hand and feels that her pain has somewhat worsened. She was seen by an outside provider back in September of 2021. There was a discussion regarding a possible cortisone injection versus possible orthopedic surgical referral. She states that she is not interested in surgical measures at this time. She was originally scheduled for a possible carpal tunnel injection, but she had issues with scheduling of this. She presents today as she has questions regarding a possible cortisone injection to her right wrist. She has been taking gabapentin and naproxen for her discomfort. She was given a prescription for oxycodone at one point for an ovarian cyst and she has taken this at times regarding her right wrist discomfort. She has questions regarding ongoing management. She seems to note a fair amount of discomfort at night and especially notes pain in the mornings when she gets out of bed.    I saw her 9/14 for possible acute carpal tunnel syndrome. Medrol Dose Pack, brace, gabapentin prescription.  Corticosteroid injection ordered.    Treatment: Bracing. She has had a carpal tunnel steroid injection at [facility] on 11/11/21 that provided only a week or so of relief. Tylenol. Nsaids.       Job description: [employer]     Past Medical History:   Diagnosis Date     Asthma, chronic 1995     Chlamydia 2003     Dysmenorrhea 2002    chronic      Impaired glucose tolerance 2009     Infertility, female 2012     Intermittent  asthma 5/24/2013     PCOS (polycystic ovarian syndrome)      PID (acute pelvic inflammatory disease) 2010 2012 HSG showed bilateral patent tubes     Tobacco use disorder       Past Surgical History:   Procedure Laterality Date     DILATION AND CURETTAGE  9/4/2012    benign     LAPAROSCOPY DIAGNOSTIC (GYN)  9/4/2012    neg        REVIEW OF SYSTEMS:  CONSTITUTIONAL:  NEGATIVE for fever, chills, change in weight  INTEGUMENTARY/SKIN:  NEGATIVE for worrisome rashes, moles or lesions  EYES:  NEGATIVE for vision changes or irritation  ENT/MOUTH:  NEGATIVE for ear, mouth and throat problems  RESP:  NEGATIVE for significant cough or SOB  BREAST:  NEGATIVE for masses, tenderness or discharge  CV:  NEGATIVE for chest pain, palpitations or peripheral edema  GI:  NEGATIVE for nausea, abdominal pain, heartburn, or change in bowel habits  :  Negative   MUSCULOSKELETAL:  See HPI above  NEURO:  See HPI above  ENDOCRINE:  NEGATIVE for temperature intolerance, skin/hair changes  HEME/ALLERGY/IMMUNE:  NEGATIVE for bleeding problems  PSYCHIATRIC:  NEGATIVE for changes in mood or affect    EXAM:  GENERAL APPEARANCE: healthy, alert and no distress   GAIT: NORMAL  PSYCH:  mentation appears normal and affect normal/bright  SKIN: no suspicious lesions or rashes  NEURO: reflexes normal in upper extremities.   Vascular: Good capp refill and pulses  RESP: No increased work of breathing  LYMPH:  No lymphedema    MSK/Neuro:   strength: decreased,   positive  thenar fasciculations.  Thenar atrophy:none.   Sensation diminished in  all digits,     Range of Motion wrist, digits: All Normal  Special tests: Tinel's positive, Phalen's positive.    EMG: had one in 2018, and was [place] but doesn't know the severity.    ASSESSMENT/PLAN  Right severely symptomatic Carpal tunnel syndrome     We talked about the options, which included repeat EMG, activity modification, night splinting, therapy,  corticosteroid injection, medrol dose pack, and carpal tunnel release.      We decided to proceed with carpal tunnel release. She would like to have this procedure asap.       She is desperate for pain relief.  I think since she has classic findings of carpal tunnel syndrome,and responded, albeit briefly to carpal tunnel corticosteroid injection.    Right  Carpal tunnel release:  We discussed the procedure of open carpal tunnel release. We discussed the risks, which include but are not limited to incisional tenderness/pillar pain, infection, minor or major neurovascular injury, tendon laceration, finger stiffness and failure to relieve symptoms.  We also discussed the alternatives, including nonsurgical options, and alternatives surgical options such as minimally invasive carpal tunnel release.  We discussed the pros and cons of open carpal tunnel release versus minimally invasive carpal tunnel release.    VERY IMPORTANT TO STOP SMOKING    MARTHA Guzman MD  Dept. Orthopedic Surgery  [facility]

## 2022-02-24 NOTE — PROGRESS NOTES
ASSESSMENT & PLAN    Ifeoma was seen today for pain.    Diagnoses and all orders for this visit:    Carpal tunnel syndrome of right wrist  -     Orthopedic  Referral; Future      This issue is acute on chronic and Worsening.    # Right Carpal Tunnel Syndrome: Symptoms noted over the past several years but worsening recently with acute pain noticed overnight with associated numbness and tingling in her right hand in the first 4 digits.  She does have pain with flexing the hand as well as numbness and tingling in the median distribution.  She has had a carpal tunnel steroid injection on 11/21 that provided only a week or so of relief.  Likely she has flare of carpal tunnel syndrome.  Given symptoms did not improve with injection plan to treat as below and follow-up with me only as needed.  Image Findings: None today  Treatment: Activities as tolerated, referral to Dr. Guzman today for consultation for carpal tunnel syndrome surgery, wrist brace all the time  Job: letter written today  Medications/Injections: Limited tylenol/ibuprofen for pain for 1-2 weeks, none  Follow-up: Dr. Guzman today in the afternoon for follow-up visit      Terell Eduardo MD  [facility]    -----  Chief Complaint   Patient presents with     Right Hand - Pain       SUBJECTIVE  Ifeoma Mahoney is a/an 31 year old female who is seen as a self referral for evaluation of right hand pain.     The patient is seen by themselves.  The patient is Right handed    Onset: 2/23/22, day(s) ago. Reports insidious onset without acute precipitating event.  Location of Pain: right hand-diffuse  Has associated numbness and tingling.  Symptoms ongoing over the past few years, now more consistent.  Associated symptoms with work improved with changing jobs now returned and lasting longer.  Symptoms worse at night.    Worsened by: constant   Better with: nothing   Treatments tried: running under cold water, wrist  "brace   Associated symptoms: numbness, pins and needles     Orthopedic/Surgical history: YES - previously saw Dr. Guzman 9/14/21 for carpal tunnel symptoms, [facility]-provided good relief for 1 week   Social History/Occupation: [employer]     No family history pertinent to patient's problem today.      REVIEW OF SYSTEMS:  Review of Systems  Constitutional, HEENT, cardiovascular, pulmonary, gi and gu systems are negative, except as otherwise noted.    OBJECTIVE:  /64   Ht 1.6 m (5' 3\")   Wt 95.3 kg (210 lb)   LMP 07/26/2021 (Exact Date)   BMI 37.20 kg/m     General: healthy, alert and in no distress  HEENT: no scleral icterus or conjunctival erythema  Skin: no suspicious lesions or rash. No jaundice.  CV: distal perfusion intact    Resp: normal respiratory effort without conversational dyspnea   Psych: normal mood and affect  Gait: normal steady gait with appropriate coordination and balance    Neuro:  Decreased sensation over right median nerve    RIGHT HAND  Inspection:    No swelling, bruising, discoloration, or obvious deformity or asymmetry  Palpation:  TTP over carpal tunnel radiating to fingers   Carpals: normal   Metacarpals: normal   Thumb: normal   Fingers: normal  Range of Motion:    Limited  and wrist flexion 2/2 pain at carpal tunnel    Strength:     limited slightly by pain, extension limited slightly by pain, flexion limited slightly by pain, abduction limited slightly by pain, adduction limited slightly by pain, opposition limited slightly by pain  Special Tests:    Positive: Tinel's, Phalen's    Negative: Finkelstein's, flexor digitorum superficialis testing, flexor digitorum profundus testing    RADIOLOGY:  No new imaging      Review of external notes as documented elsewhere in note  Reviewed Dr. Dubois's note.     "

## 2022-02-24 NOTE — LETTER
2/24/2022         RE: Ifeoma Mahoney  [address] [address]        Dear Colleague,    Thank you for referring your patient, Ifeoma Mahoney, to the [facility]. Please see a copy of my visit note below.    SUBJECTIVE:  Ifeoma Mahoney is a 31 year old female who is seen urgently in consultation at the request of Dr. Eduardo, for Right hand problems x 5 months.  She has had some issues x years, but got worse 5 months ago. Used to be only at night or the morning.  Symptoms: pain.  Has numbness and tingling in all digits.  Other symptoms include severe pain in fingers and in palm..     She notes pain that radiates into her 3rd and 4th digits. She has noted some occasional discomfort and a feeling of tightness involving her hand and feels that her pain has somewhat worsened. She was seen by an outside provider back in September of 2021. There was a discussion regarding a possible cortisone injection versus possible orthopedic surgical referral. She states that she is not interested in surgical measures at this time. She was originally scheduled for a possible carpal tunnel injection, but she had issues with scheduling of this. She presents today as she has questions regarding a possible cortisone injection to her right wrist. She has been taking gabapentin and naproxen for her discomfort. She was given a prescription for oxycodone at one point for an ovarian cyst and she has taken this at times regarding her right wrist discomfort. She has questions regarding ongoing management. She seems to note a fair amount of discomfort at night and especially notes pain in the mornings when she gets out of bed.    I saw her 9/14 for possible acute carpal tunnel syndrome. Medrol Dose Pack, brace, gabapentin prescription.  Corticosteroid injection ordered.    Treatment: Bracing. She has had a carpal tunnel steroid injection at [facility] on 11/11/21 that provided only a week or so of  relief. Tylenol. Nsaids.       Job description: [employer]     Past Medical History:   Diagnosis Date     Asthma, chronic 1995     Chlamydia 2003     Dysmenorrhea 2002    chronic      Impaired glucose tolerance 2009     Infertility, female 2012     Intermittent asthma 5/24/2013     PCOS (polycystic ovarian syndrome)      PID (acute pelvic inflammatory disease) 2010 2012 HSG showed bilateral patent tubes     Tobacco use disorder       Past Surgical History:   Procedure Laterality Date     DILATION AND CURETTAGE  9/4/2012    benign     LAPAROSCOPY DIAGNOSTIC (GYN)  9/4/2012    neg        REVIEW OF SYSTEMS:  CONSTITUTIONAL:  NEGATIVE for fever, chills, change in weight  INTEGUMENTARY/SKIN:  NEGATIVE for worrisome rashes, moles or lesions  EYES:  NEGATIVE for vision changes or irritation  ENT/MOUTH:  NEGATIVE for ear, mouth and throat problems  RESP:  NEGATIVE for significant cough or SOB  BREAST:  NEGATIVE for masses, tenderness or discharge  CV:  NEGATIVE for chest pain, palpitations or peripheral edema  GI:  NEGATIVE for nausea, abdominal pain, heartburn, or change in bowel habits  :  Negative   MUSCULOSKELETAL:  See HPI above  NEURO:  See HPI above  ENDOCRINE:  NEGATIVE for temperature intolerance, skin/hair changes  HEME/ALLERGY/IMMUNE:  NEGATIVE for bleeding problems  PSYCHIATRIC:  NEGATIVE for changes in mood or affect    EXAM:  GENERAL APPEARANCE: healthy, alert and no distress   GAIT: NORMAL  PSYCH:  mentation appears normal and affect normal/bright  SKIN: no suspicious lesions or rashes  NEURO: reflexes normal in upper extremities.   Vascular: Good capp refill and pulses  RESP: No increased work of breathing  LYMPH:  No lymphedema    MSK/Neuro:   strength: decreased,   positive  thenar fasciculations.  Thenar atrophy:none.   Sensation diminished in  all digits,     Range of Motion wrist, digits: All Normal  Special tests: Tinel's positive, Phalen's positive.    EMG: had one in 2018, and was told she  [place] ( [place] but doesn't know the severity.    ASSESSMENT/PLAN  Right severely symptomatic Carpal tunnel syndrome     We talked about the options, which included repeat EMG, activity modification, night splinting, therapy, corticosteroid injection, medrol dose pack, and carpal tunnel release.      We decided to proceed with carpal tunnel release. She would like to have this procedure asap.       She is desperate for pain relief.  I think since she has classic findings of carpal tunnel syndrome,and responded, albeit briefly to carpal tunnel corticosteroid injection.    Right  Carpal tunnel release:  We discussed the procedure of open carpal tunnel release. We discussed the risks, which include but are not limited to incisional tenderness/pillar pain, infection, minor or major neurovascular injury, tendon laceration, finger stiffness and failure to relieve symptoms.  We also discussed the alternatives, including nonsurgical options, and alternatives surgical options such as minimally invasive carpal tunnel release.  We discussed the pros and cons of open carpal tunnel release versus minimally invasive carpal tunnel release.    VERY IMPORTANT TO STOP SMOKING    MARTHA Guzman MD  Dept. Orthopedic Surgery  [facility]      Again, thank you for allowing me to participate in the care of your patient.        Sincerely,        Derek Guzman MD

## 2022-02-24 NOTE — LETTER
[facility] [place]  [address]  [place] [address]  [phone]      February 24, 2022    RE:  Ifeoma Mahoney                                                                                                                                                       [address] [address]        To whom it may concern:    Ifeoma Mahoney is under my professional care for Carpal tunnel syndrome of right wrist She may return to work with the following: The employee was seen today in clinic.        Sincerely,        Terell Eduardo MD

## 2022-02-24 NOTE — LETTER
2/24/2022         RE: Ifeoma Mahoney  [address]  [place] [address]        Dear Colleague,    Thank you for referring your patient, Ifeoma Mahoney, to the [facility] [place]. Please see a copy of my visit note below.    ASSESSMENT & PLAN    Ifeoma was seen today for pain.    Diagnoses and all orders for this visit:    Carpal tunnel syndrome of right wrist  -     Orthopedic  Referral; Future      This issue is acute on chronic and Worsening.    # Right Carpal Tunnel Syndrome: Symptoms noted over the past several years but worsening recently with acute pain noticed overnight with associated numbness and tingling in her right hand in the first 4 digits.  She does have pain with flexing the hand as well as numbness and tingling in the median distribution.  She has had a carpal tunnel steroid injection on 11/21 that provided only a week or so of relief.  Likely she has flare of carpal tunnel syndrome.  Given symptoms did not improve with injection plan to treat as below and follow-up with me only as needed.  Image Findings: None today  Treatment: Activities as tolerated, referral to Dr. Guzman today for consultation for carpal tunnel syndrome surgery, wrist brace all the time  Job: letter written today  Medications/Injections: Limited tylenol/ibuprofen for pain for 1-2 weeks, none  Follow-up: Dr. Guzman today in the afternoon for follow-up visit      Terell Eduardo MD  [facility] [place]    -----  Chief Complaint   Patient presents with     Right Hand - Pain       SUBJECTIVE  Ifeoma Mahoney is a/an 31 year old female who is seen as a self referral for evaluation of right hand pain.     The patient is seen by themselves.  The patient is Right handed    Onset: 2/23/22, day(s) ago. Reports insidious onset without acute precipitating event.  Location of Pain: right hand-diffuse  Has associated numbness and tingling.  Symptoms  "ongoing over the past few years, now more consistent.  Associated symptoms with work improved with changing jobs now returned and lasting longer.  Symptoms worse at night.    Worsened by: constant   Better with: nothing   Treatments tried: running under cold water, wrist brace   Associated symptoms: numbness, pins and needles     Orthopedic/Surgical history: YES - previously saw Dr. Guzman 9/14/21 for carpal tunnel symptoms, [facility]-provided good relief for 1 week   Social History/Occupation: [employer]     No family history pertinent to patient's problem today.      REVIEW OF SYSTEMS:  Review of Systems  Constitutional, HEENT, cardiovascular, pulmonary, gi and gu systems are negative, except as otherwise noted.    OBJECTIVE:  /64   Ht 1.6 m (5' 3\")   Wt 95.3 kg (210 lb)   LMP 07/26/2021 (Exact Date)   BMI 37.20 kg/m     General: healthy, alert and in no distress  HEENT: no scleral icterus or conjunctival erythema  Skin: no suspicious lesions or rash. No jaundice.  CV: distal perfusion intact    Resp: normal respiratory effort without conversational dyspnea   Psych: normal mood and affect  Gait: normal steady gait with appropriate coordination and balance    Neuro:  Decreased sensation over right median nerve    RIGHT HAND  Inspection:    No swelling, bruising, discoloration, or obvious deformity or asymmetry  Palpation:  TTP over carpal tunnel radiating to fingers   Carpals: normal   Metacarpals: normal   Thumb: normal   Fingers: normal  Range of Motion:    Limited  and wrist flexion 2/2 pain at carpal tunnel    Strength:     limited slightly by pain, extension limited slightly by pain, flexion limited slightly by pain, abduction limited slightly by pain, adduction limited slightly by pain, opposition limited slightly by pain  Special Tests:    Positive: Tinel's, Phalen's    Negative: Finkelstein's, flexor digitorum superficialis testing, flexor digitorum " profundus testing    RADIOLOGY:  No new imaging      Review of external notes as documented elsewhere in note  Reviewed Dr. Dubois's note.         Again, thank you for allowing me to participate in the care of your patient.        Sincerely,        Terell Eduardo MD

## 2022-02-24 NOTE — PATIENT INSTRUCTIONS
# Right Carpal Tunnel Syndrome: Symptoms noted over the past several years but worsening recently with acute pain noticed overnight with associated numbness and tingling in her right hand in the first 4 digits.  She does have pain with flexing the hand as well as numbness and tingling in the median distribution.  She has had a carpal tunnel steroid injection on 11/21 that provided only a week or so of relief.  Likely she has flare of carpal tunnel syndrome.  Given symptoms did not improve with injection plan to treat as below and follow-up with me only as needed.  Image Findings: None today  Treatment: Activities as tolerated, referral to Dr. Guzman today for consultation for carpal tunnel syndrome surgery, wrist brace all the time  Job: letter written today  Medications/Injections: Limited tylenol/ibuprofen for pain for 1-2 weeks, none  Follow-up: Dr. Guzman today in the afternoon for follow-up visit    Please call [phone]   Ask for my team if you have any questions or concerns    If you have not yet received the influenza vaccine but would like to get one, please call  [phone] or you can schedule via [employer]    It was great seeing you today!    Terell Eduardo MD, CAQSM      Patient Education     Carpal Tunnel Syndrome Prevention Tips  Carpal tunnel syndrome is a painful condition in the hand and wrist. It occurs when there is too much pressure on the median nerve at the wrist. The median nerve runs from your forearm to the palm of your hand. It may get squeezed or pressed when it passes through the carpal tunnel from your wrist to your hand. You may then feel numbness, tingling, pain, or weakness in your hand and up your forearm.  Doing the same hand activities over and over can put you at higher risk for carpal tunnel syndrome. But you can reduce your risk. Learn how to change the way you use your hands. Below are tips for at home and on the job. Also follow the hand and wrist safety policies at  your workplace.      Keep your wrist in a straight (neutral) position when exercising.      Keep your wrist in neutral  Keep a straight (neutral) wrist position as often as you can. Don t use your wrist in a bent (flexed) position for long periods of time. This includes extended or twisted positions.  When you sleep, don't have your wrist flexed (don't sleep all curled up on your side). And don't put extra pressure on your wrist for long periods of time (don't sleep on your stomach with your hands under you).  Watch your   Don t use only your thumb and index finger to grasp or lift something. This can put stress on your wrist. When you can, use your whole hand and all its fingers to grasp an object.  Minimize repetition  Don t move your arms or hands the same way for long periods of time. And don't hold an object in the same way for long periods of time. Even simple, light tasks can cause injury this way. Instead, switch tasks or switch hands.  Rest your hands  Give your hands a break from time to time with a rest. Even a few minutes once an hour can help.  Reduce speed and force  Slow down when you do a forceful, repetitive motion. This gives your wrist time to recover from the effort. Use power tools to help reduce the force.  Strengthen the muscles  Weak muscles may lead to a poor wrist or arm position. Exercises will make your hand and arm muscles stronger. This can help you keep a better position.  Ese last reviewed this educational content on 1/1/2018 2000-2021 [employer]. All rights reserved. This information is not intended as a substitute for professional medical care. Always follow your healthcare professional's instructions.

## 2022-02-25 ENCOUNTER — TELEPHONE (OUTPATIENT)
Dept: ORTHOPEDICS | Facility: CLINIC | Age: 32
End: 2022-02-25
Payer: COMMERCIAL

## 2022-02-25 DIAGNOSIS — Z11.59 ENCOUNTER FOR SCREENING FOR OTHER VIRAL DISEASES: Primary | ICD-10-CM

## 2022-02-25 PROBLEM — G56.01 CARPAL TUNNEL SYNDROME OF RIGHT WRIST: Status: ACTIVE | Noted: 2021-09-27

## 2022-02-25 NOTE — TELEPHONE ENCOUNTER
Type of surgery: release,right carpal tunnel (right)    CPT 40262  Carpal tunnel syndrome of right wrist G56.01  Pain of right hand M79.641    Location of surgery: [facility]  Date and time of surgery: 4-6-22  TBD  Surgeon: Dr Guzman  Pre-Op Appt Date: 3-24-22  Post-Op Appt Date: 4-21-22   Packet sent out: Yes  Pre-cert/Authorization completed:    No prior auth needed, per [employer] online list  Date: 02/25/22    Thank you,   Ester Mary   Prior Authorization [facility]  [phone]

## 2022-03-10 ENCOUNTER — NURSE TRIAGE (OUTPATIENT)
Dept: NURSING | Facility: CLINIC | Age: 32
End: 2022-03-10
Payer: COMMERCIAL

## 2022-03-10 ENCOUNTER — OFFICE VISIT (OUTPATIENT)
Dept: URGENT CARE | Facility: URGENT CARE | Age: 32
End: 2022-03-10
Payer: COMMERCIAL

## 2022-03-10 VITALS
OXYGEN SATURATION: 100 % | WEIGHT: 207.8 LBS | HEIGHT: 63 IN | HEART RATE: 96 BPM | DIASTOLIC BLOOD PRESSURE: 90 MMHG | TEMPERATURE: 98.2 F | BODY MASS INDEX: 36.82 KG/M2 | SYSTOLIC BLOOD PRESSURE: 126 MMHG

## 2022-03-10 DIAGNOSIS — Z20.2 EXPOSURE TO STD: Primary | ICD-10-CM

## 2022-03-10 DIAGNOSIS — B96.89 BV (BACTERIAL VAGINOSIS): ICD-10-CM

## 2022-03-10 DIAGNOSIS — R11.11 VOMITING WITHOUT NAUSEA, INTRACTABILITY OF VOMITING NOT SPECIFIED, UNSPECIFIED VOMITING TYPE: ICD-10-CM

## 2022-03-10 DIAGNOSIS — N76.0 BV (BACTERIAL VAGINOSIS): ICD-10-CM

## 2022-03-10 LAB
ALBUMIN UR-MCNC: NEGATIVE MG/DL
AMORPH CRY #/AREA URNS HPF: ABNORMAL /HPF
APPEARANCE UR: ABNORMAL
BACTERIA #/AREA URNS HPF: ABNORMAL /HPF
BILIRUB UR QL STRIP: NEGATIVE
CLUE CELLS: PRESENT
COLOR UR AUTO: YELLOW
GLUCOSE UR STRIP-MCNC: NEGATIVE MG/DL
HGB UR QL STRIP: ABNORMAL
KETONES UR STRIP-MCNC: NEGATIVE MG/DL
LEUKOCYTE ESTERASE UR QL STRIP: NEGATIVE
MUCOUS THREADS #/AREA URNS LPF: PRESENT /LPF
NITRATE UR QL: NEGATIVE
PH UR STRIP: 7 [PH] (ref 5–7)
RBC #/AREA URNS AUTO: ABNORMAL /HPF
SP GR UR STRIP: 1.02 (ref 1–1.03)
SQUAMOUS #/AREA URNS AUTO: ABNORMAL /LPF
TRICHOMONAS, WET PREP: ABNORMAL
UROBILINOGEN UR STRIP-ACNC: 0.2 E.U./DL
WBC #/AREA URNS AUTO: ABNORMAL /HPF
WBC'S/HIGH POWER FIELD, WET PREP: ABNORMAL
YEAST, WET PREP: ABNORMAL

## 2022-03-10 PROCEDURE — 87491 CHLMYD TRACH DNA AMP PROBE: CPT | Performed by: PHYSICIAN ASSISTANT

## 2022-03-10 PROCEDURE — 99214 OFFICE O/P EST MOD 30 MIN: CPT | Mod: 25 | Performed by: PHYSICIAN ASSISTANT

## 2022-03-10 PROCEDURE — 81001 URINALYSIS AUTO W/SCOPE: CPT | Performed by: PHYSICIAN ASSISTANT

## 2022-03-10 PROCEDURE — 87591 N.GONORRHOEAE DNA AMP PROB: CPT | Performed by: PHYSICIAN ASSISTANT

## 2022-03-10 PROCEDURE — 96372 THER/PROPH/DIAG INJ SC/IM: CPT | Performed by: PHYSICIAN ASSISTANT

## 2022-03-10 PROCEDURE — 87210 SMEAR WET MOUNT SALINE/INK: CPT | Performed by: PHYSICIAN ASSISTANT

## 2022-03-10 RX ORDER — METRONIDAZOLE 500 MG/1
500 TABLET ORAL 2 TIMES DAILY
Qty: 14 TABLET | Refills: 0 | Status: SHIPPED | OUTPATIENT
Start: 2022-03-10 | End: 2022-03-17

## 2022-03-10 RX ORDER — AZITHROMYCIN 500 MG/1
1000 TABLET, FILM COATED ORAL ONCE
Status: COMPLETED | OUTPATIENT
Start: 2022-03-10 | End: 2022-03-10

## 2022-03-10 RX ORDER — ONDANSETRON 4 MG/1
4 TABLET, ORALLY DISINTEGRATING ORAL ONCE
Status: COMPLETED | OUTPATIENT
Start: 2022-03-10 | End: 2022-03-10

## 2022-03-10 RX ADMIN — AZITHROMYCIN 1000 MG: 500 TABLET, FILM COATED ORAL at 15:18

## 2022-03-10 RX ADMIN — ONDANSETRON 4 MG: 4 TABLET, ORALLY DISINTEGRATING ORAL at 15:56

## 2022-03-10 ASSESSMENT — ENCOUNTER SYMPTOMS
CARDIOVASCULAR NEGATIVE: 1
VOMITING: 0
DIARRHEA: 0
FEVER: 0
ENDOCRINE NEGATIVE: 1
MYALGIAS: 0
DYSURIA: 1
MUSCULOSKELETAL NEGATIVE: 1
FLANK PAIN: 0
CHILLS: 0
NAUSEA: 0
SHORTNESS OF BREATH: 0
ACTIVITY CHANGE: 0
NEUROLOGICAL NEGATIVE: 1
HEADACHES: 0
ADENOPATHY: 0
WEAKNESS: 0
NECK PAIN: 0
CONSTITUTIONAL NEGATIVE: 1
FREQUENCY: 0
COUGH: 0
FATIGUE: 0
RESPIRATORY NEGATIVE: 1
PALPITATIONS: 0
GASTROINTESTINAL NEGATIVE: 1
LIGHT-HEADEDNESS: 0
ABDOMINAL PAIN: 0
DIZZINESS: 0
HEMATURIA: 0
SORE THROAT: 0
RHINORRHEA: 0
POLYDIPSIA: 0
NECK STIFFNESS: 0

## 2022-03-10 NOTE — PATIENT INSTRUCTIONS
Patient Education     What Are Sexually Transmitted Infections (STIs)?   A sexually transmitted infection (STI) is an infection that is spread during sex. An STI can also be called STD for sexually transmitted disease. You can become infected with an STI if you have sex with someone who has an STI. Any sex that involves the penis, vagina, anus, or mouth can spread these infections. Some STIs also spread through body fluids such as semen, vaginal fluid, or blood. Others spread through contact with infected skin. The most common STIs are chlamydia, genital warts , genital herpes, syphilis, HIV, gonorrhea, and trichomoniasis.   Who is at risk?  It doesn t matter if you re straight or blakely, male or female, young or old. Any person who has sex can get an STI. Your risk increases if:     You have more than one partner. The more partners you have, the greater your risk.    Your partner has other partners. If your partner is exposed to an STI, you could be, too.    You or your partner have had sex with other people in the past. Either of you might be carrying an STI from an earlier partner.    You have an STI. The STI may cause sores or other health problems that increase your risk for new infections. Your risk will stay high unless you are treated for your current STI and change the behaviors that put you at risk.  Prevent future problems  Left untreated, certain STIs can lead to cancer or, rarely, death. Some can harm unborn babies whose mothers are infected. Others can cause you to not be able to have children (sterility) or can affect changes in behavior or your ability to think. You can prevent these problems with safer sex, regular checkups, and early treatment. Always use a latex condom when you have sex. Get tested if you re at risk. And get treated early if you have an STI.      Using a latex condom every time you have sex can reduce your risk of STIs.     Getting checked  The only sure way to know if you have an  STI is to get checked by a healthcare provider. If you notice a change in how your body looks or feels, have it checked out. But keep in mind, STIs don t always show symptoms. So if you re at risk for STIs, get checked regularly. If you find you have an STI, have your partner get treatment. If not, his or her health is at risk. And left untreated, your partner could pass the STI back to you, or on to others.   Common symptoms  Be alert to any changes in your body and your partner s body. Symptoms may appear in or near the vagina, penis, rectum, mouth, or throat. They may include:     Unusual discharge    Lumps, bumps, or rashes    Sores that may be painful, itchy, or painless    Itchy skin    Burning with urination    Pain in the pelvis, belly (abdomen), or rectum    Bleeding from the rectum  Even if you don t have symptoms  You may have an STI even if you don t have symptoms. If you think you are at risk, get checked. Go to a clinic or to your healthcare provider. If your partner has an STI, you need to be tested even if you feel fine.   Vaccines to prevent disease   Vaccines are available to prevent hepatitis A and hepatitis B. These are 2 kinds of STIs. There is also a vaccine to prevent human papillomavirus (HPV). This is a virus that can be passed from person to person through sexual contact. Ask your healthcare provider whether any of these vaccines is right for you.   Ese last reviewed this educational content on 12/1/2018 2000-2021 [employer]. All rights reserved. This information is not intended as a substitute for professional medical care. Always follow your healthcare professional's instructions.           Patient Education     Bacterial Vaginosis    You have a vaginal infection called bacterial vaginosis (BV). Both good and bad bacteria are present in a healthy vagina. BV occurs when these bacteria get out of balance. The number of bad bacteria increase. And the number of good  bacteria decrease. BV is linked with sexual activity, but it's not a sexually transmitted infection (STI).   BV may or may not cause symptoms. If symptoms do occur, they can include:     Thin, gray, milky-white, or sometimes green discharge    Unpleasant odor or  fishy  smell    Itching, burning, or pain in or around the vagina  It is not known what causes BV, but certain factors can make the problem more likely. These can include:     Douching    Spermicides    Use of antibiotics    Change in hormone levels with pregnancy, breastfeeding, or menopause    Having sex with a new partner    Having sex with more than one partner  BV will sometimes go away on its own. But treatment is often advised. This is because untreated BV can raise the risk of more serious health problems such as:     Pelvic inflammatory disease (PID)     delivery (giving birth to a baby early if you re pregnant)    HIV and some other sexually transmitted infections (STIs)    Infection after surgery on the reproductive organs  Home care  General care    BV is most often treated with medicines called antibiotics. These may be given as pills or as a vaginal cream. If antibiotics are prescribed, be sure to use them exactly as directed. And complete all of the medicine, even if your symptoms go away.    Don't douche or having sex during treatment.    If you have sex with a female partner, ask your healthcare provider if she should also be treated.  Prevention    Don't douche.    Don't have sex. If you do have sex, then take steps to lower your risk:  ? Use condoms when having sex.  ? Limit the number of sex partners you have.    Follow-up care  Follow up with your healthcare provider, or as advised.   When to get medical advice  Call your healthcare provider right away if:     You have a fever of 100.4 F (38 C) or higher, or as directed by your provider.    Your symptoms get worse, or they don t go away within a few days of starting  treatment.    You have new pain in the lower belly or pelvic region.    You have side effects that bother you or a reaction to the pills or cream you re prescribed.    You or any of your sex partners have new symptoms, such as a rash, joint pain, or sores.  Ese last reviewed this educational content on 6/1/2020 2000-2021 [employer]. All rights reserved. This information is not intended as a substitute for professional medical care. Always follow your healthcare professional's instructions.

## 2022-03-10 NOTE — LETTER
March 11, 2022      Ifeoma Mahoney  [address] [address]        Dear ,    We are writing to inform you of your test results, see below.    Your HIV, chlamydia and gonorrhea test were negative. Enclosed is a copy of these results.    Resulted Orders   UA Macro with Reflex to Micro and Culture - lab collect   Result Value Ref Range    Color Urine Yellow Colorless, Straw, Light Yellow, Yellow    Appearance Urine Slightly Cloudy (A) Clear    Glucose Urine Negative Negative mg/dL    Bilirubin Urine Negative Negative    Ketones Urine Negative Negative mg/dL    Specific Gravity Urine 1.020 1.003 - 1.035    Blood Urine Trace (A) Negative    pH Urine 7.0 5.0 - 7.0    Protein Albumin Urine Negative Negative mg/dL    Urobilinogen Urine 0.2 0.2, 1.0 E.U./dL    Nitrite Urine Negative Negative    Leukocyte Esterase Urine Negative Negative   Wet prep - lab collect   Result Value Ref Range    Trichomonas Absent Absent    Yeast Absent Absent    Clue Cells Present (A) Absent    WBCs/high power field 1+ (A) None   Chlamydia trachomatis PCR   Result Value Ref Range    Chlamydia trachomatis Negative Negative      Comment:      A negative result by transcription mediated amplification does not preclude the presence of C. trachomatis infection because results are dependent on proper and adequate collection, absence of inhibitors and sufficient rRNA to be detected.   Neisseria gonorrhoeae PCR   Result Value Ref Range    Neisseria gonorrhoeae Negative Negative      Comment:      Negative for N. gonorrhoeae rRNA by transcription mediated amplification. A negative result by transcription mediated amplification does not preclude the presence of C. trachomatis infection because results are dependent on proper and adequate collection, absence of inhibitors and sufficient rRNA to be detected.   Urine Microscopic   Result Value Ref Range    Bacteria Urine Many (A) None Seen /HPF    RBC Urine 0-2 0-2 /HPF /HPF     WBC Urine 0-5 0-5 /HPF /HPF    Squamous Epithelials Urine Moderate (A) None Seen /LPF    Mucus Urine Present (A) None Seen /LPF    Amorphous Crystals Urine Few (A) None Seen /HPF    Narrative    Urine Culture not indicated       If you have any questions or concerns, please call the clinic at the number listed above.   Thank you for choosing [facility].      Sincerely,      Gio Cruz PA-C

## 2022-03-10 NOTE — NURSING NOTE
Clinic Administered Medication Documentation    Administrations This Visit     azithromycin (ZITHROMAX) tablet 1,000 mg     Admin Date  03/10/2022 Action  Given Dose  1,000 mg Route  Oral Site   Administered By  Eliud Block MA    Ordering Provider: Gio Cruz PA-C    Patient Supplied?: No          cefTRIAXone (ROCEPHIN) injection 500 mg     Admin Date  03/10/2022 Action  Given Dose  500 mg Route  Intramuscular Site   Administered By  Eliud Block MA    Ordering Provider: Gio Cruz PA-C    Patient Supplied?: No          ondansetron (ZOFRAN-ODT) ODT tab 4 mg     Admin Date  03/10/2022 Action  Given Dose  4 mg Route  Oral Site   Administered By  Iam Arriaga    Ordering Provider: Gio Cruz PA-C    Patient Supplied?: No                Oral Medication Documentation    Patient was given Ondansetron (Zofran) . Prior to medication administration, verified patients identity using patient s name and date of birth. Please see MAR and medication order for additional information.     Was entire amount of medication used? Yes  Expiration Date: 08/2023

## 2022-03-10 NOTE — TELEPHONE ENCOUNTER
Patient states her partner was just tested positive for Gonorrhea , and she is asking if her PCP will call in an RX for medication, without her being seen or tested.  She was advised to go to  today , and given the hours at .    Estrella Gee RN   [facility] Nurse Advisor          Additional Information    Information only question and nurse able to answer    Protocols used: INFORMATION ONLY CALL - NO TRIAGE-A-OH

## 2022-03-10 NOTE — PROGRESS NOTES
Chief Complaint:    Chief Complaint   Patient presents with     UTI     Exposure to STD     Pt said that her partner was positive of Chlamydia.         ASSESSMENT     1. Exposure to STD    2. BV (bacterial vaginosis)    3. Vomiting without nausea, intractability of vomiting not specified, unspecified vomiting type      PLAN    Urinalysis discussed with patient.  This was unremarkable for UTI.  We will call with culture results if resistant.  Wet prep was positive for clue cells.  Rx for Flagyl sent in.  With exposure, patient treated in clinic today with 500 Mg Rocephin IM and 1G Zithromax PO.  Patient vomited after taking the Zithromax.  She was given 4 Mg Zofran ODT and will repeat the dose at home in 30-60 minutes.  Follow up with PCP in 2-3 days if symptoms are not improving.  Worrisome symptoms discussed with instructions to go to the ED.  Patient verbalized understanding and agreed with this plan.    Labs:     Results for orders placed or performed in visit on 03/10/22   UA Macro with Reflex to Micro and Culture - lab collect     Status: Abnormal    Specimen: Urine, Midstream   Result Value Ref Range    Color Urine Yellow Colorless, Straw, Light Yellow, Yellow    Appearance Urine Slightly Cloudy (A) Clear    Glucose Urine Negative Negative mg/dL    Bilirubin Urine Negative Negative    Ketones Urine Negative Negative mg/dL    Specific Gravity Urine 1.020 1.003 - 1.035    Blood Urine Trace (A) Negative    pH Urine 7.0 5.0 - 7.0    Protein Albumin Urine Negative Negative mg/dL    Urobilinogen Urine 0.2 0.2, 1.0 E.U./dL    Nitrite Urine Negative Negative    Leukocyte Esterase Urine Negative Negative   Urine Microscopic     Status: Abnormal   Result Value Ref Range    Bacteria Urine Many (A) None Seen /HPF    RBC Urine 0-2 0-2 /HPF /HPF    WBC Urine 0-5 0-5 /HPF /HPF    Squamous Epithelials Urine Moderate (A) None Seen /LPF    Mucus Urine Present (A) None Seen /LPF    Amorphous Crystals Urine Few (A) None Seen /HPF     Narrative    Urine Culture not indicated   Wet prep - lab collect     Status: Abnormal    Specimen: Vagina; Swab   Result Value Ref Range    Trichomonas Absent Absent    Yeast Absent Absent    Clue Cells Present (A) Absent    WBCs/high power field 1+ (A) None       Problem history    Patient Active Problem List   Diagnosis     Tobacco use disorder     PCOS (polycystic ovarian syndrome)     Infertility, female     Abdominal pain     Dysmenorrhea     ASCUS on Pap smear     Pelvic pain in female     CARDIOVASCULAR SCREENING; LDL GOAL LESS THAN 160     Asthma     Controlled substance agreement signed     Conversion reaction     Depression     Dysplasia of cervix (uteri)     Pain in joint, pelvic region and thigh     Nausea & vomiting     Headache     PID (pelvic inflammatory disease)     Positive urine drug screen     Uterine leiomyoma     Need for Tdap vaccination     Carpal tunnel syndrome of right wrist       Current Meds    Current Outpatient Medications:      gabapentin (NEURONTIN) 300 MG capsule, Take 1 capsule (300 mg) by mouth 2 times daily, Disp: 60 capsule, Rfl: 0     metroNIDAZOLE (FLAGYL) 500 MG tablet, Take 1 tablet (500 mg) by mouth 2 times daily for 7 days, Disp: 14 tablet, Rfl: 0     traZODone (DESYREL) 50 MG tablet, TAKE 1 TABLET(50 MG) BY MOUTH EVERY NIGHT AS NEEDED FOR SLEEP, Disp: 30 tablet, Rfl: 11  No current facility-administered medications for this visit.    Allergies  Allergies   Allergen Reactions     Benzoyl Peroxide Swelling       SUBJECTIVE    HPI:  Ifeoma Mahoney is a 32 year old female who has presents for STD screening. Patient states that she was advised by her partner that she was exposed to Gonorrhea and Chlamydia.  She has had some dysuria for the past several days. she denies back pain, nausea, vomiting, fever and chills, flank pain, vaginal discharge, and vaginal odor.    ROS:      Review of Systems   Constitutional: Negative.  Negative for activity change, chills, fatigue and  fever.   HENT: Negative for congestion, ear pain, rhinorrhea and sore throat.    Respiratory: Negative.  Negative for cough and shortness of breath.    Cardiovascular: Negative.  Negative for chest pain and palpitations.   Gastrointestinal: Negative.  Negative for abdominal pain, diarrhea, nausea and vomiting.   Endocrine: Negative.  Negative for polydipsia and polyuria.   Genitourinary: Positive for dysuria. Negative for flank pain, frequency, hematuria, pelvic pain, urgency, vaginal discharge and vaginal pain.   Musculoskeletal: Negative.  Negative for myalgias, neck pain and neck stiffness.   Allergic/Immunologic: Negative for immunocompromised state.   Neurological: Negative.  Negative for dizziness, weakness, light-headedness and headaches.   Hematological: Negative for adenopathy.       Family History   Family History   Problem Relation Age of Onset     Hypertension Mother         Social History  Social History     Socioeconomic History     Marital status: Significant other     Spouse name: chary Crisostomo     Number of children: 0     Years of education: Not on file     Highest education level: Not on file   Occupational History     Employer: UNEMPLOYED   Tobacco Use     Smoking status: Current Every Day Smoker     Packs/day: 0.10     Types: Cigarettes     Smokeless tobacco: Never Used     Tobacco comment: smokes 7 cigarettes per day, interested in quitting   Vaping Use     Vaping Use: Never used   Substance and Sexual Activity     Alcohol use: Yes     Drug use: No     Sexual activity: Yes     Partners: Male     Birth control/protection: None   Other Topics Concern     Parent/sibling w/ CABG, MI or angioplasty before 65F 55M? Not Asked   Social History Narrative     Not on file     Social Determinants of Health     Financial Resource Strain: Not on file   Food Insecurity: Not on file   Transportation Needs: Not on file   Physical Activity: Not on file   Stress: Not on file   Social Connections: Not on file  "  Intimate Partner Violence: Not on file   Housing Stability: Not on file           OBJECTIVE     Vital signs noted and reviewed by Gio Cruz PA-C  BP (!) 126/90 (BP Location: Left arm, Patient Position: Sitting, Cuff Size: Adult Large)   Pulse 96   Temp 98.2  F (36.8  C) (Tympanic)   Ht 1.6 m (5' 3\")   Wt 94.3 kg (207 lb 12.8 oz)   LMP 07/26/2021 (Exact Date)   SpO2 100%   BMI 36.81 kg/m       Physical Exam  Vitals and nursing note reviewed.   Constitutional:       General: She is not in acute distress.     Appearance: Normal appearance. She is well-developed. She is not ill-appearing, toxic-appearing or diaphoretic.   HENT:      Head: Normocephalic and atraumatic.      Right Ear: Tympanic membrane and external ear normal.      Left Ear: Tympanic membrane and external ear normal.   Eyes:      Pupils: Pupils are equal, round, and reactive to light.   Cardiovascular:      Rate and Rhythm: Normal rate and regular rhythm.      Heart sounds: Normal heart sounds. No murmur heard.    No friction rub. No gallop.   Pulmonary:      Effort: Pulmonary effort is normal. No respiratory distress.      Breath sounds: Normal breath sounds. No wheezing or rales.   Chest:      Chest wall: No tenderness.   Abdominal:      General: Bowel sounds are normal. There is no distension.      Palpations: Abdomen is soft. Abdomen is not rigid. There is no mass.      Tenderness: There is no abdominal tenderness. There is no guarding or rebound. Negative signs include Cortez's sign and McBurney's sign.   Musculoskeletal:      Cervical back: Normal range of motion and neck supple.   Lymphadenopathy:      Cervical: No cervical adenopathy.   Skin:     General: Skin is warm and dry.   Neurological:      Mental Status: She is alert and oriented to person, place, and time.      Cranial Nerves: No cranial nerve deficit.      Deep Tendon Reflexes: Reflexes are normal and symmetric.   Psychiatric:         Behavior: Behavior normal. Behavior " is cooperative.         Thought Content: Thought content normal.         Judgment: Judgment normal.             Gio Cruz PA-C  3/10/2022, 2:56 PM

## 2022-03-11 LAB
C TRACH DNA SPEC QL NAA+PROBE: NEGATIVE
N GONORRHOEA DNA SPEC QL NAA+PROBE: NEGATIVE

## 2022-03-16 ENCOUNTER — TELEPHONE (OUTPATIENT)
Dept: FAMILY MEDICINE | Facility: CLINIC | Age: 32
End: 2022-03-16
Payer: COMMERCIAL

## 2022-03-16 NOTE — TELEPHONE ENCOUNTER
Pt calling for lab results from 3/10/22.  Pt notified of provider note on results as written.  Pt has no further questions.  Yary SANABRIAN, RN

## 2022-03-17 ENCOUNTER — OFFICE VISIT (OUTPATIENT)
Dept: OBGYN | Facility: CLINIC | Age: 32
End: 2022-03-17
Payer: COMMERCIAL

## 2022-03-17 VITALS
HEART RATE: 82 BPM | BODY MASS INDEX: 36.31 KG/M2 | DIASTOLIC BLOOD PRESSURE: 93 MMHG | SYSTOLIC BLOOD PRESSURE: 137 MMHG | WEIGHT: 205 LBS | OXYGEN SATURATION: 82 %

## 2022-03-17 DIAGNOSIS — Z31.41 FERTILITY TESTING: ICD-10-CM

## 2022-03-17 DIAGNOSIS — E28.2 PCOS (POLYCYSTIC OVARIAN SYNDROME): Primary | ICD-10-CM

## 2022-03-17 PROCEDURE — 99214 OFFICE O/P EST MOD 30 MIN: CPT | Performed by: OBSTETRICS & GYNECOLOGY

## 2022-03-17 NOTE — PATIENT INSTRUCTIONS
If you have any questions regarding your visit, Please contact your care team.     [employer] [facility]: [phone]  Women [facility] CLINIC HOURS TELEPHONE NUMBER       Manuel Zuñiga M.D.    Ivana-MELISSA Sun-MELISSA Garcia-Medical Assistant    Radha-  Analilia-     Monday-[place]  8:00a.m-4:45 p.m  Tuesday-[place]  9:00a.m-4:00 p.m  Wednesday-[place] 8:00a.m-4:45 p.m.  Thursday-[place]  8:00a.m-4:45 p.m.  Friday-[place]  8:00a.m-4:45 p.m. [facility]  [address] [place] [address] [phone] ask for [facility]  [phone] Fax  Imaging [phone]    [facility]  [address] [place] [address] [place] [address]  [phone]    [facility]  [address] [place]  [place] [address] [phone] ask [facility]  [phone] Fax  Imaging [phone]     Urgent Care locations:    [facility] Monday-Friday  10 am - 8 pm  Saturday and Sunday   9 am - 5 pm  Monday-Friday   10 am - 8 pm  Saturday and Sunday   9 am - 5 pm   [phone]   If you need a medication refill, please contact your pharmacy. Please allow 3 business days for your refill to be completed.  As always, Thank you for trusting us with your healthcare needs!

## 2022-03-18 ASSESSMENT — PATIENT HEALTH QUESTIONNAIRE - PHQ9: SUM OF ALL RESPONSES TO PHQ QUESTIONS 1-9: 7

## 2022-03-18 NOTE — PROGRESS NOTES
OB-GYN Problem-Oriented Visit or Consultation      Ifeoma Mahoney is a 32 year old year old P 0 who presents with a chief complaint of fertility concerns.  Referred by self.  Patient's last menstrual period was 07/01/2021 (exact date).    Assessment:   Encounter Diagnoses   Name Primary?     PCOS (polycystic ovarian syndrome) Yes     Fertility testing          Plan and Recommendations: Check D21P. Consider repeat HSG. RE referral information given.   Return to clinic for exam and Pap/HRHPV.   I reviewed the condition, causes, differential diagnosis, prognosis, evaluation and management considerations and options.  Questions answered and information given. See orders.   Encouraged smoking cessation and patient will follow-up with Dr. Eaton for this.   30 minutes spent on the date of encounter doing chart review, history, discussion with patient, and documentation, and further activities as noted, and review of appropriateness of decision-making for care, and review of test results, and interpretation of test results     A/P:  Ifeoma was seen today for physical.    Diagnoses and all orders for this visit:    PCOS (polycystic ovarian syndrome)  -     Progesterone; Future    Fertility testing  -     Progesterone; Future        Manuel Zuñiga MD    HPI:     See prior notes. History of PCOS and PID but HSG showed patent tubes ten years ago. No longer in relationship and considering possible pregnancy through other means such as donor insemination. Recent STD screening neg. Basic hormonal profile and pelvic ultrasound last yr were normal except polycystic ovaries. Menses have improved and now more regular q 28-30 days x 4 days. Reports positive home OPK tests a few months ago. Smoker and wondered about Chantix. Has carpal tunnel surgery soon. LMP 3/1.     Past medical, obstetrical, surgical, family and social history reviewed and as noted or updated in chart.     Allergies, meds and supplements are as noted or updated in  chart.      ROS:   Systems reviewed include:  constitutional, breast, genitourinary, psychological, hematologic/lymphatic and endocrine.    These systems were negative for significant symptoms except for the following additional: none; see HPI.    EXAM:  VS as noted. BP (!) 137/93 (BP Location: Left arm, Patient Position: Chair, Cuff Size: Adult Regular)   Pulse 82   Wt 93 kg (205 lb)   LMP 07/01/2021 (Exact Date)   SpO2 (!) 82%   Breastfeeding No   BMI 36.31 kg/m    Constitutionally normal.     Exam not repeated at this time.    Manuel Zuñiga MD

## 2022-03-22 ENCOUNTER — LAB (OUTPATIENT)
Dept: LAB | Facility: CLINIC | Age: 32
End: 2022-03-22
Payer: COMMERCIAL

## 2022-03-22 DIAGNOSIS — E28.2 PCOS (POLYCYSTIC OVARIAN SYNDROME): ICD-10-CM

## 2022-03-22 DIAGNOSIS — Z31.41 FERTILITY TESTING: ICD-10-CM

## 2022-03-22 LAB — PROGEST SERPL-MCNC: 0.6 NG/ML

## 2022-03-22 PROCEDURE — 84144 ASSAY OF PROGESTERONE: CPT

## 2022-03-22 PROCEDURE — 36415 COLL VENOUS BLD VENIPUNCTURE: CPT

## 2022-03-24 ENCOUNTER — OFFICE VISIT (OUTPATIENT)
Dept: FAMILY MEDICINE | Facility: CLINIC | Age: 32
End: 2022-03-24
Payer: COMMERCIAL

## 2022-03-24 VITALS
OXYGEN SATURATION: 97 % | RESPIRATION RATE: 20 BRPM | TEMPERATURE: 98.4 F | BODY MASS INDEX: 35.99 KG/M2 | SYSTOLIC BLOOD PRESSURE: 122 MMHG | HEART RATE: 86 BPM | WEIGHT: 203.13 LBS | HEIGHT: 63 IN | DIASTOLIC BLOOD PRESSURE: 82 MMHG

## 2022-03-24 DIAGNOSIS — Z01.818 PREOP GENERAL PHYSICAL EXAM: Primary | ICD-10-CM

## 2022-03-24 DIAGNOSIS — G56.01 CARPAL TUNNEL SYNDROME OF RIGHT WRIST: ICD-10-CM

## 2022-03-24 PROCEDURE — 99214 OFFICE O/P EST MOD 30 MIN: CPT | Performed by: PHYSICIAN ASSISTANT

## 2022-03-24 RX ORDER — GABAPENTIN 300 MG/1
CAPSULE ORAL
Qty: 21 CAPSULE | Refills: 0 | Status: SHIPPED | OUTPATIENT
Start: 2022-03-24 | End: 2023-06-13

## 2022-03-24 RX ORDER — GABAPENTIN 600 MG/1
1200 TABLET ORAL 2 TIMES DAILY
Qty: 56 TABLET | Refills: 0 | Status: SHIPPED | OUTPATIENT
Start: 2022-03-24 | End: 2022-04-14

## 2022-03-24 ASSESSMENT — PAIN SCALES - GENERAL: PAINLEVEL: EXTREME PAIN (9)

## 2022-03-24 ASSESSMENT — ASTHMA QUESTIONNAIRES: ACT_TOTALSCORE: 25

## 2022-03-24 NOTE — H&P (VIEW-ONLY)
[address] [address]  Phone: [phone]  Primary Provider: Jumana Eaton  Pre-op Performing Provider: NADIA DURBIN      PREOPERATIVE EVALUATION:  Today's date: 3/24/2022    Ifeoma Mahoney is a 32 year old female who presents for a preoperative evaluation.    Surgical Information:  Surgery/Procedure: Release right carpal tunnel  Surgery Location: [facility]   Surgeon: MARTHA Guzman MD  Surgery Date: April 6th, 2022  Time of Surgery: 10:05  Where patient plans to recover: At home with family  Fax number for surgical facility: Note does not need to be faxed, will be available electronically in Epic.    Type of Anesthesia Anticipated: General    Assessment & Plan     The proposed surgical procedure is considered low to  INTERMEDIATE risk.    Problem List Items Addressed This Visit     Carpal tunnel syndrome of right wrist    Relevant Medications    gabapentin (NEURONTIN) 600 MG tablet    gabapentin (NEURONTIN) 300 MG capsule      Other Visit Diagnoses     Preop general physical exam    -  Primary               Risks and Recommendations:  The patient has the following additional risks and recommendations for perioperative complications:   - No identified additional risk factors other than previously addressed    Medication Instructions:   - pregabalin, gabapentin: Continue without modification.   - SSRIs, SNRIs, TCAs, Antipsychotics: Continue without modification.     RECOMMENDATION:  APPROVAL GIVEN to proceed with proposed procedure, without further diagnostic evaluation.    Increasing gabapentin dosing until surgery    Review of prior external note(s) from - ortho         17 minutes spent on the date of the encounter doing chart review, history and exam, documentation and further activities per the note        Subjective     HPI related to upcoming procedure: Patient has been following with ortho for CTS, having  failed conservative measures,  now requiring surgical intervention.   Is currentl taking 900 mg gabapetin BID.      Asthma well controlled    Preop Questions 3/24/2022   1. Have you ever had a heart attack or stroke? No   2. Have you ever had surgery on your heart or blood vessels, such as a stent placement, a coronary artery bypass, or surgery on an artery in your head, neck, heart, or legs? No   3. Do you have chest pain with activity? No   4. Do you have a history of  heart failure? No   5. Do you currently have a cold, bronchitis or symptoms of other infection? No   6. Do you have a cough, shortness of breath, or wheezing? No   7. Do you or anyone in your family have previous history of blood clots? No   8. Do you or does anyone in your family have a serious bleeding problem such as prolonged bleeding following surgeries or cuts? No   9. Have you ever had problems with anemia or been told to take iron pills? No   10. Have you had any abnormal blood loss such as black, tarry or bloody stools, or abnormal vaginal bleeding? No   11. Have you ever had a blood transfusion? No   12. Are you willing to have a blood transfusion if it is medically needed before, during, or after your surgery? Yes   13. Have you or any of your relatives ever had problems with anesthesia? No   14. Do you have sleep apnea, excessive snoring or daytime drowsiness? No   15. Do you have any artifical heart valves or other implanted medical devices like a pacemaker, defibrillator, or continuous glucose monitor? No   16. Do you have artificial joints? No   17. Are you allergic to latex? No   18. Is there any chance that you may be pregnant? No       Health Care Directive:  Patient does not have a Health Care Directive or Living Will: Discussed advance care planning with patient; information given to patient to review.    Preoperative Review of :   reviewed - controlled substances reflected in medication list.          Review of  Systems  Constitutional, neuro, ENT, endocrine, pulmonary, cardiac, gastrointestinal, genitourinary, musculoskeletal, integument and psychiatric systems are negative, except as otherwise noted.    Patient Active Problem List    Diagnosis Date Noted     Carpal tunnel syndrome of right wrist 09/27/2021     Priority: Medium     Need for Tdap vaccination 08/31/2021     Priority: Medium     Positive urine drug screen 01/17/2014     Priority: Medium     Formatting of this note might be different from the original.  Patient urine drug screen showed marijuana and oxymorphone on January 2014.  Oxymorphone was not prescribed recently and patient declined that she is taking any narcotic for one week before this test was done. I will not recommend to give any narcotic to this patient.  She needs to be seen by pain specialist at this point.       Pain in joint, pelvic region and thigh 05/28/2013     Priority: Medium     Formatting of this note might be different from the original.  Was seen by interventional pain specialist.  Yulia Richard MD  5/28/2013, 1:52 PM    ; pelvic pain syndrome       Controlled substance agreement signed 04/10/2013     Priority: Medium     Formatting of this note might be different from the original.  Pt requesting narcotics for Uterine fibroid and dysmenorrhea, urine drug screen April 2013 +ve for THC metabolites.     As PCP, this provider Will not prescribe any narcotic pain med on a regular basis. Will recommend to see pain specialist.    Yulia Richard MD  4/10/2013, 3:37 PM    Patient is referred to [facility] for her chronic pain medications.    Yulia Richard MD  5/3/2013, 4:51 PM    Pt comes in and says she was not doing any more street drugs and would abide the rules and requested pain meds, also agrees for U tox today. Though pt was reminded to drop the sample by this provider and latter by nurse while handing the hard copy of prescription and while doing HPV vaccine, still  did not drop the urine sample and walked away after picking up the prescription.    Will not prescribe any more narcotics by this provider.    Talked about the Pain specialist referral for the third time. Pt does not follow any recommendations and walks out with prescriptions of narcotics.    Yulia Richard MD  7/17/2013, 11:35 AM       Pelvic pain in female 01/24/2013     Priority: Medium     CARDIOVASCULAR SCREENING; LDL GOAL LESS THAN 160 01/24/2013     Priority: Medium     Tobacco use disorder 12/12/2012     Priority: Medium     Uterine leiomyoma 12/12/2012     Priority: Medium     ASCUS on Pap smear 09/12/2012     Priority: Medium     6/30/10 pap ASCUS  9/5/12 pap ASCUS neg HPV. Plan- pap in 1 year       Dysmenorrhea 07/28/2012     Priority: Medium     Formatting of this note might be different from the original.  Patient is chronic pain patient and seeks out various providers. I saw the patient today and encouraged establishment of care plan. See my note. She states she wants pregnancy but must stable from pain management standpoint and have plan to minimize or eliminate fetal exposure during pregnancy before we would proceed. Thanks. Gareth Deras MD       Conversion reaction 07/01/2012     Priority: Medium     Depression 07/01/2012     Priority: Medium     Headache 07/01/2012     Priority: Medium     Infertility, female      Priority: Medium     PCOS (polycystic ovarian syndrome) 12/20/2009     Priority: Medium     Abdominal pain 12/20/2009     Priority: Medium     Nausea & vomiting 12/20/2009     Priority: Medium     PID (pelvic inflammatory disease) 12/20/2009     Priority: Medium     Asthma 01/30/2007     Priority: Medium     Formatting of this note might be different from the original.  Asthma  NOS       Dysplasia of cervix (uteri) 01/30/2007     Priority: Medium     Formatting of this note might be different from the original.    6/30/10 pap ASCUS  9/5/12 pap ASCUS neg HPV. Plan- pap in 1 year  "    ; ASCUS on Pap smear    Formatting of this note might be different from the original.  LW Modifier:  Pap LGSIL, colpo ASHLYN I 8/06  ; ASHLYN Squamous Intraepith Lesion Cervix        Past Medical History:   Diagnosis Date     Asthma, chronic 1995     Chlamydia 2003     Dysmenorrhea 2002    chronic      Impaired glucose tolerance 2009     Infertility, female 2012     Intermittent asthma 5/24/2013     PCOS (polycystic ovarian syndrome)      PID (acute pelvic inflammatory disease) 2010 2012 HSG showed bilateral patent tubes     Tobacco use disorder      Past Surgical History:   Procedure Laterality Date     DILATION AND CURETTAGE  9/4/2012    benign     LAPAROSCOPY DIAGNOSTIC (GYN)  9/4/2012    neg     Current Outpatient Medications   Medication Sig Dispense Refill     gabapentin (NEURONTIN) 300 MG capsule Take 4 caps at bedtime , 3 in the mornings for 3 days, then start the 600 mg capsules. 21 capsule 0     gabapentin (NEURONTIN) 300 MG capsule Take 1 capsule (300 mg) by mouth 2 times daily 60 capsule 0     gabapentin (NEURONTIN) 600 MG tablet Take 2 tablets (1,200 mg) by mouth 2 times daily for 14 days Start after completing 300 mg capsule taper. 56 tablet 0     traZODone (DESYREL) 50 MG tablet TAKE 1 TABLET(50 MG) BY MOUTH EVERY NIGHT AS NEEDED FOR SLEEP 30 tablet 11       Allergies   Allergen Reactions     Benzoyl Peroxide Swelling        Social History     Tobacco Use     Smoking status: Current Every Day Smoker     Packs/day: 0.10     Types: Cigarettes     Smokeless tobacco: Never Used     Tobacco comment: smokes 7 cigarettes per day, interested in quitting   Substance Use Topics     Alcohol use: Yes     Comment: social       History   Drug Use No         Objective     /82 (BP Location: Left arm, Patient Position: Sitting, Cuff Size: Adult Large)   Pulse 86   Temp 98.4  F (36.9  C) (Tympanic)   Resp 20   Ht 1.6 m (5' 2.99\")   Wt 92.1 kg (203 lb 2 oz)   LMP 07/26/2021 (Exact Date)   SpO2 97%   BMI " 35.99 kg/m      Physical Exam    GENERAL APPEARANCE: healthy, alert and no distress     EYES: EOMI, PERRL     HENT: ear canals and TM's normal and nose and mouth without ulcers or lesions     NECK: no adenopathy, no asymmetry, masses, or scars     RESP: lungs clear to auscultation - no rales, rhonchi or wheezes     CV: regular rates and rhythm, normal S1 S2, no S3 or S4 and no murmur, click or rub     ABDOMEN:  soft, nontender, no HSM or masses and bowel sounds normal     MS: extremities normal- no gross deformities noted, no evidence of inflammation in joints, FROM in all extremities.     SKIN: no suspicious lesions or rashes     NEURO: Normal strength and tone, sensory exam grossly normal, mentation intact and speech normal     PSYCH: mentation appears normal. and affect normal/bright     LYMPHATICS: No cervical adenopathy    No results for input(s): HGB, PLT, INR, NA, POTASSIUM, CR, A1C in the last 84519 hours.     Diagnostics:  No labs were ordered during this visit.   No EKG required, no history of coronary heart disease, significant arrhythmia, peripheral arterial disease or other structural heart disease.    Revised Cardiac Risk Index (RCRI):  The patient has the following serious cardiovascular risks for perioperative complications:   - No serious cardiac risks = 0 points     RCRI Interpretation: 0 points: Class I (very low risk - 0.4% complication rate)           Signed Electronically by: KE Vargas  Reviewed and agree with assessment and plan above. Hawk Cleaning MD    Copy of this evaluation report is provided to requesting physician.

## 2022-03-24 NOTE — PATIENT INSTRUCTIONS
Preparing for Your Surgery  Getting started  A nurse will call you to review your health history and instructions. They will give you an arrival time based on your scheduled surgery time. Please be ready to share:    Your doctor's clinic name and phone number    Your medical, surgical and anesthesia history    A list of allergies and sensitivities    A list of medicines, including herbal treatments and over-the-counter drugs    Whether the patient has a legal guardian (ask how to send us the papers in advance)  Please tell us if you're pregnant--or if there's any chance you might be pregnant. Some surgeries may injure a fetus (unborn baby), so they require a pregnancy test. Surgeries that are safe for a fetus don't always need a test, and you can choose whether to have one.   If you have a child who's having surgery, please ask for a copy of Preparing for Your Child's Surgery.    Preparing for surgery    Within 30 days of surgery: Have a pre-op exam (sometimes called an H&P, or History and Physical). This can be done at a clinic or pre-operative center.  ? If you're having a , you may not need this exam. Talk to your care team.    At your pre-op exam, talk to your care team about all medicines you take. If you need to stop any medicines before surgery, ask when to start taking them again.  ? We do this for your safety. Many medicines can make you bleed too much during surgery. Some change how well surgery (anesthesia) drugs work.    Call your insurance company to let them know you're having surgery. (If you don't have insurance, call [phone].)    Call your clinic if there's any change in your health. This includes signs of a cold or flu (sore throat, runny nose, cough, rash, fever). It also includes a scrape or scratch near the surgery site.    If you have questions on the day of surgery, call your hospital or surgery center.  COVID testing  You may need to be tested for COVID-19 before having  surgery. If so, your surgical team will give you instructions for scheduling this test, separate from your preoperative history and physical.  Eating and drinking guidelines  For your safety: Unless your surgeon tells you otherwise, follow the guidelines below.    Eat and drink as usual until 8 hours before surgery. After that, no food or milk.    Drink clear liquids until 2 hours before surgery. These are liquids you can see through, like water, Gatorade and Propel Water. You may also have black coffee and tea (no cream or milk).    Nothing by mouth within 2 hours of surgery. This includes gum, candy and breath mints.    If you drink alcohol: Stop drinking it the night before surgery.    If your care team tells you to take medicine on the morning of surgery, it's okay to take it with a sip of water.  Preventing infection    Shower or bathe the night before and morning of your surgery. Follow the instructions your clinic gave you. (If no instructions, use regular soap.)    Don't shave or clip hair near your surgery site. We'll remove the hair if needed.    Don't smoke or vape the morning of surgery. You may chew nicotine gum up to 2 hours before surgery. A nicotine patch is okay.  ? Note: Some surgeries require you to completely quit smoking and nicotine. Check with your surgeon.    Your care team will make every effort to keep you safe from infection. We will:  ? Clean our hands often with soap and water (or an alcohol-based hand rub).  ? Clean the skin at your surgery site with a special soap that kills germs.  ? Give you a special gown to keep you warm. (Cold raises the risk of infection.)  ? Wear special hair covers, masks, gowns and gloves during surgery.  ? Give antibiotic medicine, if prescribed. Not all surgeries need antibiotics.  What to bring on the day of surgery    Photo ID and insurance card    Copy of your health care directive, if you have one    Glasses and hearing aides (bring cases)  ? You can't  wear contacts during surgery    Inhaler and eye drops, if you use them (tell us about these when you arrive)    CPAP machine or breathing device, if you use them    A few personal items, if spending the night    If you have . . .  ? A pacemaker, ICD (cardiac defibrillator) or other implant: Bring the ID card.  ? An implanted stimulator: Bring the remote control.  ? A legal guardian: Bring a copy of the certified (court-stamped) guardianship papers.  Please remove any jewelry, including body piercings. Leave jewelry and other valuables at home.  If you're going home the day of surgery    You must have a responsible adult drive you home. They should stay with you overnight as well.    If you don't have someone to stay with you, and you aren't safe to go home alone, we may keep you overnight. Insurance often won't pay for this.  After surgery  If it's hard to control your pain or you need more pain medicine, please call your surgeon's office.  Questions?   If you have any questions for your care team, list them here: _________________________________________________________________________________________________________________________________________________________________________ ____________________________________ ____________________________________ ____________________________________  For informational purposes only. Not to replace the advice of your health care provider. Copyright   2003, 2019 [facility]. All rights reserved. Clinically reviewed by Nyla Jenkins MD. [employer] [id number] - REV 07/21.

## 2022-03-24 NOTE — PROGRESS NOTES
[address] [address]  Phone: [phone]  Primary Provider: Jumana Eaton  Pre-op Performing Provider: NADIA DURBIN      PREOPERATIVE EVALUATION:  Today's date: 3/24/2022    Ifeoma Mahoney is a 32 year old female who presents for a preoperative evaluation.    Surgical Information:  Surgery/Procedure: Release right carpal tunnel  Surgery Location: [facility]   Surgeon: MARTHA Guzman MD  Surgery Date: April 6th, 2022  Time of Surgery: 10:05  Where patient plans to recover: At home with family  Fax number for surgical facility: Note does not need to be faxed, will be available electronically in Epic.    Type of Anesthesia Anticipated: General    Assessment & Plan     The proposed surgical procedure is considered low to  INTERMEDIATE risk.    Problem List Items Addressed This Visit     Carpal tunnel syndrome of right wrist    Relevant Medications    gabapentin (NEURONTIN) 600 MG tablet    gabapentin (NEURONTIN) 300 MG capsule      Other Visit Diagnoses     Preop general physical exam    -  Primary               Risks and Recommendations:  The patient has the following additional risks and recommendations for perioperative complications:   - No identified additional risk factors other than previously addressed    Medication Instructions:   - pregabalin, gabapentin: Continue without modification.   - SSRIs, SNRIs, TCAs, Antipsychotics: Continue without modification.     RECOMMENDATION:  APPROVAL GIVEN to proceed with proposed procedure, without further diagnostic evaluation.    Increasing gabapentin dosing until surgery    Review of prior external note(s) from - ortho         17 minutes spent on the date of the encounter doing chart review, history and exam, documentation and further activities per the note        Subjective     HPI related to upcoming procedure: Patient has been following with ortho for CTS, having  failed conservative measures,  now requiring surgical intervention.   Is currentl taking 900 mg gabapetin BID.      Asthma well controlled    Preop Questions 3/24/2022   1. Have you ever had a heart attack or stroke? No   2. Have you ever had surgery on your heart or blood vessels, such as a stent placement, a coronary artery bypass, or surgery on an artery in your head, neck, heart, or legs? No   3. Do you have chest pain with activity? No   4. Do you have a history of  heart failure? No   5. Do you currently have a cold, bronchitis or symptoms of other infection? No   6. Do you have a cough, shortness of breath, or wheezing? No   7. Do you or anyone in your family have previous history of blood clots? No   8. Do you or does anyone in your family have a serious bleeding problem such as prolonged bleeding following surgeries or cuts? No   9. Have you ever had problems with anemia or been told to take iron pills? No   10. Have you had any abnormal blood loss such as black, tarry or bloody stools, or abnormal vaginal bleeding? No   11. Have you ever had a blood transfusion? No   12. Are you willing to have a blood transfusion if it is medically needed before, during, or after your surgery? Yes   13. Have you or any of your relatives ever had problems with anesthesia? No   14. Do you have sleep apnea, excessive snoring or daytime drowsiness? No   15. Do you have any artifical heart valves or other implanted medical devices like a pacemaker, defibrillator, or continuous glucose monitor? No   16. Do you have artificial joints? No   17. Are you allergic to latex? No   18. Is there any chance that you may be pregnant? No       Health Care Directive:  Patient does not have a Health Care Directive or Living Will: Discussed advance care planning with patient; information given to patient to review.    Preoperative Review of :   reviewed - controlled substances reflected in medication list.          Review of  Systems  Constitutional, neuro, ENT, endocrine, pulmonary, cardiac, gastrointestinal, genitourinary, musculoskeletal, integument and psychiatric systems are negative, except as otherwise noted.    Patient Active Problem List    Diagnosis Date Noted     Carpal tunnel syndrome of right wrist 09/27/2021     Priority: Medium     Need for Tdap vaccination 08/31/2021     Priority: Medium     Positive urine drug screen 01/17/2014     Priority: Medium     Formatting of this note might be different from the original.  Patient urine drug screen showed marijuana and oxymorphone on January 2014.  Oxymorphone was not prescribed recently and patient declined that she is taking any narcotic for one week before this test was done. I will not recommend to give any narcotic to this patient.  She needs to be seen by pain specialist at this point.       Pain in joint, pelvic region and thigh 05/28/2013     Priority: Medium     Formatting of this note might be different from the original.  Was seen by interventional pain specialist.  Yulia Richard MD  5/28/2013, 1:52 PM    ; pelvic pain syndrome       Controlled substance agreement signed 04/10/2013     Priority: Medium     Formatting of this note might be different from the original.  Pt requesting narcotics for Uterine fibroid and dysmenorrhea, urine drug screen April 2013 +ve for THC metabolites.     As PCP, this provider Will not prescribe any narcotic pain med on a regular basis. Will recommend to see pain specialist.    Yulia Richard MD  4/10/2013, 3:37 PM    Patient is referred to [facility] for her chronic pain medications.    Yulia Richard MD  5/3/2013, 4:51 PM    Pt comes in and says she was not doing any more street drugs and would abide the rules and requested pain meds, also agrees for U tox today. Though pt was reminded to drop the sample by this provider and latter by nurse while handing the hard copy of prescription and while doing HPV vaccine, still  did not drop the urine sample and walked away after picking up the prescription.    Will not prescribe any more narcotics by this provider.    Talked about the Pain specialist referral for the third time. Pt does not follow any recommendations and walks out with prescriptions of narcotics.    Yulia Richard MD  7/17/2013, 11:35 AM       Pelvic pain in female 01/24/2013     Priority: Medium     CARDIOVASCULAR SCREENING; LDL GOAL LESS THAN 160 01/24/2013     Priority: Medium     Tobacco use disorder 12/12/2012     Priority: Medium     Uterine leiomyoma 12/12/2012     Priority: Medium     ASCUS on Pap smear 09/12/2012     Priority: Medium     6/30/10 pap ASCUS  9/5/12 pap ASCUS neg HPV. Plan- pap in 1 year       Dysmenorrhea 07/28/2012     Priority: Medium     Formatting of this note might be different from the original.  Patient is chronic pain patient and seeks out various providers. I saw the patient today and encouraged establishment of care plan. See my note. She states she wants pregnancy but must stable from pain management standpoint and have plan to minimize or eliminate fetal exposure during pregnancy before we would proceed. Thanks. Gareth Deras MD       Conversion reaction 07/01/2012     Priority: Medium     Depression 07/01/2012     Priority: Medium     Headache 07/01/2012     Priority: Medium     Infertility, female      Priority: Medium     PCOS (polycystic ovarian syndrome) 12/20/2009     Priority: Medium     Abdominal pain 12/20/2009     Priority: Medium     Nausea & vomiting 12/20/2009     Priority: Medium     PID (pelvic inflammatory disease) 12/20/2009     Priority: Medium     Asthma 01/30/2007     Priority: Medium     Formatting of this note might be different from the original.  Asthma  NOS       Dysplasia of cervix (uteri) 01/30/2007     Priority: Medium     Formatting of this note might be different from the original.    6/30/10 pap ASCUS  9/5/12 pap ASCUS neg HPV. Plan- pap in 1 year  "    ; ASCUS on Pap smear    Formatting of this note might be different from the original.  LW Modifier:  Pap LGSIL, colpo ASHLYN I 8/06  ; ASHLYN Squamous Intraepith Lesion Cervix        Past Medical History:   Diagnosis Date     Asthma, chronic 1995     Chlamydia 2003     Dysmenorrhea 2002    chronic      Impaired glucose tolerance 2009     Infertility, female 2012     Intermittent asthma 5/24/2013     PCOS (polycystic ovarian syndrome)      PID (acute pelvic inflammatory disease) 2010 2012 HSG showed bilateral patent tubes     Tobacco use disorder      Past Surgical History:   Procedure Laterality Date     DILATION AND CURETTAGE  9/4/2012    benign     LAPAROSCOPY DIAGNOSTIC (GYN)  9/4/2012    neg     Current Outpatient Medications   Medication Sig Dispense Refill     gabapentin (NEURONTIN) 300 MG capsule Take 4 caps at bedtime , 3 in the mornings for 3 days, then start the 600 mg capsules. 21 capsule 0     gabapentin (NEURONTIN) 300 MG capsule Take 1 capsule (300 mg) by mouth 2 times daily 60 capsule 0     gabapentin (NEURONTIN) 600 MG tablet Take 2 tablets (1,200 mg) by mouth 2 times daily for 14 days Start after completing 300 mg capsule taper. 56 tablet 0     traZODone (DESYREL) 50 MG tablet TAKE 1 TABLET(50 MG) BY MOUTH EVERY NIGHT AS NEEDED FOR SLEEP 30 tablet 11       Allergies   Allergen Reactions     Benzoyl Peroxide Swelling        Social History     Tobacco Use     Smoking status: Current Every Day Smoker     Packs/day: 0.10     Types: Cigarettes     Smokeless tobacco: Never Used     Tobacco comment: smokes 7 cigarettes per day, interested in quitting   Substance Use Topics     Alcohol use: Yes     Comment: social       History   Drug Use No         Objective     /82 (BP Location: Left arm, Patient Position: Sitting, Cuff Size: Adult Large)   Pulse 86   Temp 98.4  F (36.9  C) (Tympanic)   Resp 20   Ht 1.6 m (5' 2.99\")   Wt 92.1 kg (203 lb 2 oz)   LMP 07/26/2021 (Exact Date)   SpO2 97%   BMI " 35.99 kg/m      Physical Exam    GENERAL APPEARANCE: healthy, alert and no distress     EYES: EOMI, PERRL     HENT: ear canals and TM's normal and nose and mouth without ulcers or lesions     NECK: no adenopathy, no asymmetry, masses, or scars     RESP: lungs clear to auscultation - no rales, rhonchi or wheezes     CV: regular rates and rhythm, normal S1 S2, no S3 or S4 and no murmur, click or rub     ABDOMEN:  soft, nontender, no HSM or masses and bowel sounds normal     MS: extremities normal- no gross deformities noted, no evidence of inflammation in joints, FROM in all extremities.     SKIN: no suspicious lesions or rashes     NEURO: Normal strength and tone, sensory exam grossly normal, mentation intact and speech normal     PSYCH: mentation appears normal. and affect normal/bright     LYMPHATICS: No cervical adenopathy    No results for input(s): HGB, PLT, INR, NA, POTASSIUM, CR, A1C in the last 85781 hours.     Diagnostics:  No labs were ordered during this visit.   No EKG required, no history of coronary heart disease, significant arrhythmia, peripheral arterial disease or other structural heart disease.    Revised Cardiac Risk Index (RCRI):  The patient has the following serious cardiovascular risks for perioperative complications:   - No serious cardiac risks = 0 points     RCRI Interpretation: 0 points: Class I (very low risk - 0.4% complication rate)           Signed Electronically by: KE Vargas  Reviewed and agree with assessment and plan above. Hawk Cleaning MD    Copy of this evaluation report is provided to requesting physician.

## 2022-04-04 ENCOUNTER — LAB (OUTPATIENT)
Dept: URGENT CARE | Facility: URGENT CARE | Age: 32
End: 2022-04-04
Payer: COMMERCIAL

## 2022-04-04 DIAGNOSIS — Z11.59 ENCOUNTER FOR SCREENING FOR OTHER VIRAL DISEASES: ICD-10-CM

## 2022-04-04 PROCEDURE — U0003 INFECTIOUS AGENT DETECTION BY NUCLEIC ACID (DNA OR RNA); SEVERE ACUTE RESPIRATORY SYNDROME CORONAVIRUS 2 (SARS-COV-2) (CORONAVIRUS DISEASE [COVID-19]), AMPLIFIED PROBE TECHNIQUE, MAKING USE OF HIGH THROUGHPUT TECHNOLOGIES AS DESCRIBED BY CMS-2020-01-R: HCPCS

## 2022-04-04 PROCEDURE — U0005 INFEC AGEN DETEC AMPLI PROBE: HCPCS

## 2022-04-05 ENCOUNTER — ANESTHESIA EVENT (OUTPATIENT)
Dept: SURGERY | Facility: AMBULATORY SURGERY CENTER | Age: 32
End: 2022-04-05
Payer: COMMERCIAL

## 2022-04-05 DIAGNOSIS — G56.01 CARPAL TUNNEL SYNDROME OF RIGHT WRIST: ICD-10-CM

## 2022-04-05 LAB — SARS-COV-2 RNA RESP QL NAA+PROBE: NEGATIVE

## 2022-04-05 NOTE — ANESTHESIA PREPROCEDURE EVALUATION
Anesthesia Pre-Procedure Evaluation    Patient: Ifeoma Mahoney   MRN: [id number] : 1990        Procedure : Procedure(s):  RELEASE, RIGHT CARPAL TUNNEL          Past Medical History:   Diagnosis Date     Asthma, chronic      Chlamydia      Dysmenorrhea     chronic      Impaired glucose tolerance 2009     Infertility, female 2012     Intermittent asthma 2013     PCOS (polycystic ovarian syndrome)      PID (acute pelvic inflammatory disease) 2010 HSG showed bilateral patent tubes     Tobacco use disorder       Past Surgical History:   Procedure Laterality Date     DILATION AND CURETTAGE  2012    benign     LAPAROSCOPY DIAGNOSTIC (GYN)  2012    neg      Allergies   Allergen Reactions     Benzoyl Peroxide Swelling      Social History     Tobacco Use     Smoking status: Current Every Day Smoker     Packs/day: 0.10     Types: Cigarettes     Smokeless tobacco: Never Used     Tobacco comment: smokes 7 cigarettes per day, interested in quitting   Substance Use Topics     Alcohol use: Yes     Comment: social      Wt Readings from Last 1 Encounters:   22 92.1 kg (203 lb 2 oz)           Physical Exam    Airway        Mallampati: II   TM distance: > 3 FB   Neck ROM: full   Mouth opening: > 3 cm    Respiratory Devices and Support         Dental  no notable dental history         Cardiovascular   cardiovascular exam normal          Pulmonary   pulmonary exam normal                OUTSIDE LABS:  CBC:   Lab Results   Component Value Date    WBC 9.0 2012    WBC 9.4 2012    HGB 12.4 2012    HGB 13.4 2012    HCT 35.7 2012    HCT 39.8 2012     2012     2012     BMP: No results found for: NA, POTASSIUM, CHLORIDE, CO2, BUN, CR, GLC  COAGS: No results found for: PTT, INR, FIBR  POC:   Lab Results   Component Value Date    HCG neg 2012     HEPATIC: No results found for: ALBUMIN, PROTTOTAL, ALT, AST, GGT, ALKPHOS, BILITOTAL,  BILIDIRECT, JEROME  OTHER:   Lab Results   Component Value Date    A1C 6.0 06/20/2012    TSH 0.66 08/10/2021       Anesthesia Plan    ASA Status:  2   NPO Status:  NPO Appropriate    Anesthesia Type: MAC.     - Reason for MAC: straight local not clinically adequate   Induction: Intravenous, Propofol.   Maintenance: TIVA.        Consents    Anesthesia Plan(s) and associated risks, benefits, and realistic alternatives discussed. Questions answered and patient/representative(s) expressed understanding.    - Discussed:     - Discussed with:  Patient      - Extended Intubation/Ventilatory Support Discussed: No.      - Patient is DNR/DNI Status: No    Use of blood products discussed: No .     Postoperative Care    Pain management: IV analgesics, Oral pain medications, Multi-modal analgesia.   PONV prophylaxis: Dexamethasone or Solumedrol, Ondansetron (or other 5HT-3), Background Propofol Infusion     Comments:    Other Comments: No block            Wayne Greene MD

## 2022-04-06 ENCOUNTER — TELEPHONE (OUTPATIENT)
Dept: ORTHOPEDICS | Facility: CLINIC | Age: 32
End: 2022-04-06

## 2022-04-06 ENCOUNTER — ANESTHESIA (OUTPATIENT)
Dept: SURGERY | Facility: AMBULATORY SURGERY CENTER | Age: 32
End: 2022-04-06
Payer: COMMERCIAL

## 2022-04-06 ENCOUNTER — HOSPITAL ENCOUNTER (OUTPATIENT)
Facility: AMBULATORY SURGERY CENTER | Age: 32
Discharge: HOME | End: 2022-04-06
Attending: ORTHOPAEDIC SURGERY | Admitting: ORTHOPAEDIC SURGERY
Payer: COMMERCIAL

## 2022-04-06 VITALS
RESPIRATION RATE: 12 BRPM | OXYGEN SATURATION: 100 % | HEART RATE: 88 BPM | DIASTOLIC BLOOD PRESSURE: 95 MMHG | TEMPERATURE: 98.7 F | SYSTOLIC BLOOD PRESSURE: 142 MMHG

## 2022-04-06 DIAGNOSIS — Z98.890 S/P CARPAL TUNNEL RELEASE: Primary | ICD-10-CM

## 2022-04-06 DIAGNOSIS — G56.01 CARPAL TUNNEL SYNDROME OF RIGHT WRIST: ICD-10-CM

## 2022-04-06 LAB — HCG UR QL: NEGATIVE

## 2022-04-06 PROCEDURE — G8907 PT DOC NO EVENTS ON DISCHARG: HCPCS

## 2022-04-06 PROCEDURE — 64721 CARPAL TUNNEL SURGERY: CPT | Mod: RT | Performed by: ORTHOPAEDIC SURGERY

## 2022-04-06 PROCEDURE — 81025 URINE PREGNANCY TEST: CPT | Performed by: STUDENT IN AN ORGANIZED HEALTH CARE EDUCATION/TRAINING PROGRAM

## 2022-04-06 PROCEDURE — G8918 PT W/O PREOP ORDER IV AB PRO: HCPCS

## 2022-04-06 PROCEDURE — 64721 CARPAL TUNNEL SURGERY: CPT | Mod: RT

## 2022-04-06 RX ORDER — CEFAZOLIN SODIUM 2 G/100ML
2 INJECTION, SOLUTION INTRAVENOUS SEE ADMIN INSTRUCTIONS
Status: DISCONTINUED | OUTPATIENT
Start: 2022-04-06 | End: 2022-04-07 | Stop reason: HOSPADM

## 2022-04-06 RX ORDER — HYDRALAZINE HYDROCHLORIDE 20 MG/ML
2.5-5 INJECTION INTRAMUSCULAR; INTRAVENOUS EVERY 10 MIN PRN
Status: DISCONTINUED | OUTPATIENT
Start: 2022-04-06 | End: 2022-04-07 | Stop reason: HOSPADM

## 2022-04-06 RX ORDER — CELECOXIB 200 MG/1
400 CAPSULE ORAL ONCE
Status: COMPLETED | OUTPATIENT
Start: 2022-04-06 | End: 2022-04-06

## 2022-04-06 RX ORDER — ONDANSETRON 4 MG/1
4 TABLET, ORALLY DISINTEGRATING ORAL EVERY 30 MIN PRN
Status: DISCONTINUED | OUTPATIENT
Start: 2022-04-06 | End: 2022-04-07 | Stop reason: HOSPADM

## 2022-04-06 RX ORDER — HYDROCODONE BITARTRATE AND ACETAMINOPHEN 5; 325 MG/1; MG/1
1 TABLET ORAL
Status: DISCONTINUED | OUTPATIENT
Start: 2022-04-06 | End: 2022-04-07 | Stop reason: HOSPADM

## 2022-04-06 RX ORDER — PROPOFOL 10 MG/ML
INJECTION, EMULSION INTRAVENOUS CONTINUOUS PRN
Status: DISCONTINUED | OUTPATIENT
Start: 2022-04-06 | End: 2022-04-06

## 2022-04-06 RX ORDER — CELECOXIB 200 MG/1
200 CAPSULE ORAL
Status: DISCONTINUED | OUTPATIENT
Start: 2022-04-06 | End: 2022-04-07 | Stop reason: HOSPADM

## 2022-04-06 RX ORDER — PROPOFOL 10 MG/ML
INJECTION, EMULSION INTRAVENOUS PRN
Status: DISCONTINUED | OUTPATIENT
Start: 2022-04-06 | End: 2022-04-06

## 2022-04-06 RX ORDER — OXYCODONE HYDROCHLORIDE 5 MG/1
5 TABLET ORAL EVERY 4 HOURS PRN
Status: DISCONTINUED | OUTPATIENT
Start: 2022-04-06 | End: 2022-04-07 | Stop reason: HOSPADM

## 2022-04-06 RX ORDER — ONDANSETRON 2 MG/ML
4 INJECTION INTRAMUSCULAR; INTRAVENOUS EVERY 30 MIN PRN
Status: DISCONTINUED | OUTPATIENT
Start: 2022-04-06 | End: 2022-04-07 | Stop reason: HOSPADM

## 2022-04-06 RX ORDER — ACETAMINOPHEN 325 MG/1
975 TABLET ORAL ONCE
Status: DISCONTINUED | OUTPATIENT
Start: 2022-04-06 | End: 2022-04-07 | Stop reason: HOSPADM

## 2022-04-06 RX ORDER — LIDOCAINE 40 MG/G
CREAM TOPICAL
Status: DISCONTINUED | OUTPATIENT
Start: 2022-04-06 | End: 2022-04-07 | Stop reason: HOSPADM

## 2022-04-06 RX ORDER — SODIUM CHLORIDE, SODIUM LACTATE, POTASSIUM CHLORIDE, CALCIUM CHLORIDE 600; 310; 30; 20 MG/100ML; MG/100ML; MG/100ML; MG/100ML
INJECTION, SOLUTION INTRAVENOUS CONTINUOUS
Status: DISCONTINUED | OUTPATIENT
Start: 2022-04-06 | End: 2022-04-07 | Stop reason: HOSPADM

## 2022-04-06 RX ORDER — ACETAMINOPHEN 325 MG/1
975 TABLET ORAL ONCE
Status: COMPLETED | OUTPATIENT
Start: 2022-04-06 | End: 2022-04-06

## 2022-04-06 RX ORDER — HYDROCODONE BITARTRATE AND ACETAMINOPHEN 5; 325 MG/1; MG/1
1-2 TABLET ORAL EVERY 4 HOURS PRN
Qty: 15 TABLET | Refills: 0 | Status: SHIPPED | OUTPATIENT
Start: 2022-04-06 | End: 2022-04-21

## 2022-04-06 RX ORDER — FENTANYL CITRATE 50 UG/ML
25 INJECTION, SOLUTION INTRAMUSCULAR; INTRAVENOUS
Status: DISCONTINUED | OUTPATIENT
Start: 2022-04-06 | End: 2022-04-07 | Stop reason: HOSPADM

## 2022-04-06 RX ORDER — FENTANYL CITRATE 50 UG/ML
INJECTION, SOLUTION INTRAMUSCULAR; INTRAVENOUS PRN
Status: DISCONTINUED | OUTPATIENT
Start: 2022-04-06 | End: 2022-04-06

## 2022-04-06 RX ORDER — METOPROLOL TARTRATE 1 MG/ML
1-2 INJECTION, SOLUTION INTRAVENOUS EVERY 5 MIN PRN
Status: DISCONTINUED | OUTPATIENT
Start: 2022-04-06 | End: 2022-04-07 | Stop reason: HOSPADM

## 2022-04-06 RX ORDER — OXYCODONE AND ACETAMINOPHEN 5; 325 MG/1; MG/1
1 TABLET ORAL EVERY 6 HOURS PRN
Qty: 15 TABLET | Refills: 0 | Status: SHIPPED | OUTPATIENT
Start: 2022-04-06 | End: 2022-04-21

## 2022-04-06 RX ORDER — CEFAZOLIN SODIUM 2 G/100ML
2 INJECTION, SOLUTION INTRAVENOUS
Status: DISCONTINUED | OUTPATIENT
Start: 2022-04-06 | End: 2022-04-07 | Stop reason: HOSPADM

## 2022-04-06 RX ORDER — FENTANYL CITRATE 50 UG/ML
25 INJECTION, SOLUTION INTRAMUSCULAR; INTRAVENOUS EVERY 5 MIN PRN
Status: DISCONTINUED | OUTPATIENT
Start: 2022-04-06 | End: 2022-04-07 | Stop reason: HOSPADM

## 2022-04-06 RX ORDER — GABAPENTIN 600 MG/1
TABLET ORAL
Qty: 56 TABLET | Refills: 0 | OUTPATIENT
Start: 2022-04-06

## 2022-04-06 RX ADMIN — OXYCODONE HYDROCHLORIDE 5 MG: 5 TABLET ORAL at 09:51

## 2022-04-06 RX ADMIN — ACETAMINOPHEN 975 MG: 325 TABLET ORAL at 08:01

## 2022-04-06 RX ADMIN — SODIUM CHLORIDE, SODIUM LACTATE, POTASSIUM CHLORIDE, CALCIUM CHLORIDE: 600; 310; 30; 20 INJECTION, SOLUTION INTRAVENOUS at 08:16

## 2022-04-06 RX ADMIN — FENTANYL CITRATE 50 MCG: 50 INJECTION, SOLUTION INTRAMUSCULAR; INTRAVENOUS at 08:57

## 2022-04-06 RX ADMIN — CEFAZOLIN SODIUM 2 G: 2 INJECTION, SOLUTION INTRAVENOUS at 08:56

## 2022-04-06 RX ADMIN — FENTANYL CITRATE 50 MCG: 50 INJECTION, SOLUTION INTRAMUSCULAR; INTRAVENOUS at 08:55

## 2022-04-06 RX ADMIN — PROPOFOL 30 MG: 10 INJECTION, EMULSION INTRAVENOUS at 08:55

## 2022-04-06 RX ADMIN — CELECOXIB 400 MG: 200 CAPSULE ORAL at 08:00

## 2022-04-06 RX ADMIN — PROPOFOL 150 MCG/KG/MIN: 10 INJECTION, EMULSION INTRAVENOUS at 08:55

## 2022-04-06 NOTE — TELEPHONE ENCOUNTER
Called pt per JBR he wanted to why she cant take the hydrocodone. Pt states it gives her an upset stomach and constipation. She would like oxycodone sent to her pharmacy as she has had this before and she has less side effects please advise.  Mila Jackman CMA 4/6/2022 4:03 PM

## 2022-04-06 NOTE — INTERVAL H&P NOTE
"I have reviewed the surgical (or preoperative) H&P that is linked to this encounter, and examined the patient. There are no significant changes    Clinical Conditions Present on Arrival:  Clinically Significant Risk Factors Present on Admission                    # Obesity: Estimated body mass index is 35.99 kg/m  as calculated from the following:    Height as of 3/24/22: 1.6 m (5' 2.99\").    Weight as of 3/24/22: 92.1 kg (203 lb 2 oz).       "

## 2022-04-06 NOTE — ANESTHESIA POSTPROCEDURE EVALUATION
Patient: Ifeoma Mahoney    Procedure: Procedure(s):  RELEASE, RIGHT CARPAL TUNNEL       Anesthesia Type:  MAC    Note:  Disposition: Outpatient   Postop Pain Control: Uneventful            Sign Out: Well controlled pain   PONV:    Neuro/Psych: Uneventful            Sign Out: Acceptable/Baseline neuro status   Airway/Respiratory: Uneventful            Sign Out: Acceptable/Baseline resp. status   CV/Hemodynamics: Uneventful            Sign Out: Acceptable CV status; No obvious hypovolemia; No obvious fluid overload   Other NRE:    DID A NON-ROUTINE EVENT OCCUR?            Last vitals:  Vitals Value Taken Time   /95 04/06/22 1000   Temp 98.7  F (37.1  C) 04/06/22 1000   Pulse     Resp 12 04/06/22 1000   SpO2 100 % 04/06/22 1000       Electronically Signed By: Wayne Greene MD  April 6, 2022  11:14 AM

## 2022-04-06 NOTE — TELEPHONE ENCOUNTER
Reason for Call:  Other call back    Detailed comments: Patient is checking status of previous message. Please call.     Phone Number Patient can be reached at: Home number on file [phone] (home)    Best Time: any    Can we leave a detailed message on this number? YES    Call taken on 4/6/2022 at 2:05 PM by Harriett Velazquez

## 2022-04-06 NOTE — TELEPHONE ENCOUNTER
[facility] Call Center    Phone Message    May a detailed message be left on voicemail: yes     Reason for Call: Other: Patient is not able to take Hydrocodone and was sent home with that. Please contact patient right away to change medications. DOS 4/6     Action Taken: Message routed to:  [facility] ortho    Travel Screening: Not Applicable

## 2022-04-06 NOTE — TELEPHONE ENCOUNTER
Per DAHLIA pt is to being hydrocodone back to pharmacy in order to get the rx for oxycodone. I called the pharmacy to inform them of this.  Mila Jackman CMA 4/6/2022 5:04 PM

## 2022-04-06 NOTE — DISCHARGE INSTRUCTIONS
[facility]  Same-Day Surgery   Adult Discharge Orders & Instructions   For 24 hours after surgery  1. Get plenty of rest.  A responsible adult must stay with you for at least 24 hours after you leave the hospital.   2. Do not drive or use heavy equipment.  If you have weakness or tingling, don't drive or use heavy equipment until this feeling goes away.  3. Do not drink alcohol.  4. Avoid strenuous or risky activities.  Ask for help when climbing stairs.   5. You may feel lightheaded.  IF so, sit for a few minutes before standing.  Have someone help you get up.   6. If you have nausea (feel sick to your stomach): Drink only clear liquids such as apple juice, ginger ale, broth or 7-Up.  Rest may also help.  Be sure to drink enough fluids.  Move to a regular diet as you feel able.  7. You may have a slight fever. Call the doctor if your fever is over 100 F (37.7 C) (taken under the tongue) or lasts longer than 24 hours.  8. You may have a dry mouth, a sore throat, muscle aches or trouble sleeping.  These should go away after 24 hours.  9. Do not make important or legal decisions.   Call your doctor for any of the followin.  Signs of infection (fever, growing tenderness at the surgery site, a large amount of drainage or bleeding, severe pain, foul-smelling drainage, redness, swelling).    2. It has been over 8 to 10 hours since surgery and you are still not able to urinate (pass water).    3.  Headache for over 24 hours.    4.  Numbness, tingling or weakness the day after surgery (if you had spinal anesthesia).  To contact a doctor, call To contact Dr Guzman call:  [phone]

## 2022-04-06 NOTE — ANESTHESIA CARE TRANSFER NOTE
Patient: Ifeoma Mahoney    Procedure: Procedure(s):  RELEASE, RIGHT CARPAL TUNNEL       Diagnosis: Right carpal tunnel syndrome [G56.01]  Diagnosis Additional Information: No value filed.    Anesthesia Type:   MAC     Note:    Oropharynx: spontaneously breathing  Level of Consciousness: awake  Oxygen Supplementation: room air    Independent Airway: airway patency satisfactory and stable  Dentition: dentition unchanged  Vital Signs Stable: post-procedure vital signs reviewed and stable  Report to RN Given: handoff report given  Patient transferred to: Phase II    Handoff Report: Identifed the Patient, Identified the Reponsible Provider, Reviewed the pertinent medical history, Discussed the surgical course, Reviewed Intra-OP anesthesia mangement and issues during anesthesia, Set expectations for post-procedure period and Allowed opportunity for questions and acknowledgement of understanding      Vitals:  Vitals Value Taken Time   BP     Temp     Pulse     Resp     SpO2         Electronically Signed By: COLLIN Gamble CRNA  April 6, 2022  9:38 AM

## 2022-04-06 NOTE — OP NOTE
OPERATIVE REPORT    DATE OF SERVICE:  April 6, 2022       PREOPERATIVE DIAGNOSIS  Right carpal tunnel syndrome.      POSTOPERATIVE DIAGNOSIS  Right carpal tunnel syndrome.      NAME OF OPERATION  Right carpal tunnel release.     SURGEON  Derek Guzman MD     FIRST ASSISTANT  WILLIAM Moreland.     ANESTHESIA: MAC    ESTIMATED BLOOD LOSS: minimal 1cc    Complications: none known    Specimen: none    INDICATIONS  The patient is a 32 year old female with severe right carpal tunnel syndrome based on symptoms and signs. No recent EMG, but had one in the past. The symptoms have been quite bothersome.  Conservative treatment has failed.  Informed consent was obtained for the procedure.  She was strongly advised to stop smoking prior to and continue nonsmoking after surgery.     PROCEDURE IN DETAIL  The patient was taken to the operating room and placed on the operating table in the supine position.  Tourniquet was placed around the proximal forearm. The limb was prepped and draped in the usual sterile fashion.  IV sedation was given by Anesthesia.  Local anesthetic using a combination of 0.25% Marcaine and 1% lidocaine with epinepherine was injected in the anticipated incision site.  The limb was exsanguinated and tourniquet inflated to 250 mmHg.    A longitudinal incision was made in the usual location at the base of the palm just ulnar to midline.  The incision was taken down through the skin and subcutaneous tissue carefully to the level of the palmar fascia.  The palmar fascia was then carefully incised, and the transverse carpal ligament was then exposed. The transverse carpal ligament was incised from distal to proximal under direct vision while protecting underlying structures with an elevator.  The TCL was tight and moderately thick.  Proximally, we protected the underlying structures using a clamp, and then completed the ligament incision using a tenotomy scissors.  Once I felt that the transverse carpal  ligament was released completely, we spread the clamp to make sure that it was in fact completely released.  The wound was irrigated and the tourniquet deflated.  Minor bleeders were encountered and bovied.  The median nerve was observed to turn deep red . Then, 5-0 nylon sutures placed in a vertical mattress fashion were used to close the skin, and a sterile dressing was applied followed by a volar splint. The patient was then transferred to the hospital cart and to the recovery area in stable condition.    MARTHA Guzman MD  Dept. Orthopedic Surgery  [facility]

## 2022-04-11 ENCOUNTER — TELEPHONE (OUTPATIENT)
Dept: ORTHOPEDICS | Facility: CLINIC | Age: 32
End: 2022-04-11
Payer: COMMERCIAL

## 2022-04-11 DIAGNOSIS — Z98.890 S/P CARPAL TUNNEL RELEASE: Primary | ICD-10-CM

## 2022-04-11 NOTE — TELEPHONE ENCOUNTER
Patient calling to request refill from Dr. Guzman for oxyCODONE-acetaminophen (PERCOCET) 5-325 MG tablet    Pharmacy: [id number] - [place], [place] - [address]    Sissy Mccullough

## 2022-04-12 RX ORDER — OXYCODONE AND ACETAMINOPHEN 5; 325 MG/1; MG/1
1 TABLET ORAL EVERY 6 HOURS PRN
Qty: 10 TABLET | Refills: 0 | Status: SHIPPED | OUTPATIENT
Start: 2022-04-12 | End: 2022-04-21

## 2022-04-13 ENCOUNTER — TELEPHONE (OUTPATIENT)
Dept: FAMILY MEDICINE | Facility: CLINIC | Age: 32
End: 2022-04-13
Payer: COMMERCIAL

## 2022-04-13 DIAGNOSIS — G56.01 CARPAL TUNNEL SYNDROME OF RIGHT WRIST: ICD-10-CM

## 2022-04-14 ENCOUNTER — TELEPHONE (OUTPATIENT)
Dept: ORTHOPEDICS | Facility: CLINIC | Age: 32
End: 2022-04-14
Payer: COMMERCIAL

## 2022-04-14 ENCOUNTER — NURSE TRIAGE (OUTPATIENT)
Dept: NURSING | Facility: CLINIC | Age: 32
End: 2022-04-14
Payer: COMMERCIAL

## 2022-04-14 RX ORDER — GABAPENTIN 300 MG/1
300 CAPSULE ORAL 3 TIMES DAILY
Qty: 90 CAPSULE | Refills: 0 | Status: SHIPPED | OUTPATIENT
Start: 2022-04-14 | End: 2023-02-08

## 2022-04-14 NOTE — TELEPHONE ENCOUNTER
Called pt and informed JBR will not refill any more of her pain meds she states she slipped and fell in the shower and states she is waiting for a ride to the ER. Pt informed me she is in extreme pain and doesn't understand why we wont give her any more pain medications. I advised she should be using other OTC NSAIDS, ice rest and elevation. She states nothing works to take away the pain, I then advised that ED would be able to control her pain. Pt is agreeable to this plan.  Mila Jackman CMA 4/14/2022 2:38 PM

## 2022-04-14 NOTE — TELEPHONE ENCOUNTER
Patient this calling on status of this medicine.  Beth Estrella  [facility]  2nd Floor  Primary Care

## 2022-04-14 NOTE — TELEPHONE ENCOUNTER
Patient calling for a refill on oxyCODONE-acetaminophen (PERCOCET) 5-325 MG tablet to be sent to [facility] pharmacy.  Beth Estrella  [facility]  2nd Floor  Primary Care

## 2022-04-14 NOTE — TELEPHONE ENCOUNTER
Patient had right carpal tunnel relief surgery one week ago.  Last night fell in the shower.  Patient has sutures in right hand from surgery.  Patient has a cast on her right wrist and patient feels that her sutures has popped open.  Patient cannot feel her sutures due to cast and patient does not know if there is a rash or if her sutures are bleeding.  Patient states that she will go to ED.    COVID 19 Nurse Triage Plan/Patient Instructions    Please be aware that novel coronavirus (COVID-19) may be circulating in the community. If you develop symptoms such as fever, cough, or SOB or if you have concerns about the presence of another infection including coronavirus (COVID-19), please contact your health care provider or visit [email or web address].     Disposition/Instructions    ED Visit recommended. Follow protocol based instructions.     Bring Your Own Device:  Please also bring your smart device(s) (smart phones, tablets, laptops) and their charging cables for your personal use and to communicate with your care team during your visit.    Thank you for taking steps to prevent the spread of this virus.  o Limit your contact with others.  o Wear a simple mask to cover your cough.  o Wash your hands well and often.    Resources    [facility]: About COVID-19: [email or web address]    CDC: What to Do If You're Sick: www.cdc.gov/coronavirus/2019-ncov/about/steps-when-sick.html    CDC: Ending Home Isolation: www.cdc.gov/coronavirus/2019-ncov/hcp/disposition-in-home-patients.html     CDC: Caring for Someone: www.cdc.gov/coronavirus/2019-ncov/if-you-are-sick/care-for-someone.html     [facility]: Interim Guidance for Hospital Discharge to Home: www.health.[facility].[place]/diseases/coronavirus/hcp/hospdischarge.pdf    [facility] clinical trials (COVID-19 research studies): clinicalaffairs.[facility]/umn-clinical-trials     Below are the COVID-19 hotlines at the [place] Department of Health ([facility]).  Interpreters are available.   o For health questions: Call 124-309-1829 or 1-809.212.1141 (7 a.m. to 7 p.m.)  o For questions about schools and childcare: Call 841-357-6327 or 1-371.958.2317 (7 a.m. to 7 p.m.)                       Reason for Disposition    Bleeding from incision and won't stop after 10 minutes of direct pressure    Additional Information    Negative: Sounds like a life-threatening emergency to the triager    Protocols used: POST-OP INCISION SYMPTOMS AND [id number], POST-OP SYMPTOMS AND [id number]

## 2022-04-14 NOTE — TELEPHONE ENCOUNTER
Routing refill request to provider for review/approval because:  Drug not on the [facility] refill protocol     Ines SANABRIAN, RN

## 2022-04-21 ENCOUNTER — OFFICE VISIT (OUTPATIENT)
Dept: ORTHOPEDICS | Facility: CLINIC | Age: 32
End: 2022-04-21
Payer: COMMERCIAL

## 2022-04-21 VITALS
WEIGHT: 203 LBS | DIASTOLIC BLOOD PRESSURE: 96 MMHG | SYSTOLIC BLOOD PRESSURE: 133 MMHG | HEIGHT: 63 IN | HEART RATE: 99 BPM | OXYGEN SATURATION: 99 % | BODY MASS INDEX: 35.97 KG/M2

## 2022-04-21 DIAGNOSIS — Z98.890 S/P CARPAL TUNNEL RELEASE: Primary | ICD-10-CM

## 2022-04-21 PROCEDURE — 99024 POSTOP FOLLOW-UP VISIT: CPT | Performed by: ORTHOPAEDIC SURGERY

## 2022-04-21 ASSESSMENT — PAIN SCALES - GENERAL: PAINLEVEL: SEVERE PAIN (7)

## 2022-04-21 NOTE — PROGRESS NOTES
Patient apparently left approx 4:57 pm.  We did no see her go prior to seeing Dr Guzman. She was given post op instructions at time of check in as well as thru AVS and instructed in brace wear which was issued today.  Miguel Ángel THOMAS

## 2022-04-21 NOTE — PATIENT INSTRUCTIONS
POST CARPAL TUNNEL RELEASE INSTRUCTIONS    Wear velcro splint day and night for 10 days.  Remove to do wrist range of motion and gentle gripping exercises 4-5 time per day, and for hygiene.     In 10 days, brace use during the day is OPTIONAL, BUT should be worn at night for 10 days.      In 3 weeks from now, brace wear at any time is OPTIONAL    Avoid heavy or repetitive use of the hand.  But you should do finger range of motion therapeutically.    Wound:  Once sutures are out, it is ok to get wound wet in the shower or gently running water.  NO SOAKING in water until 3 weeks postoperative, and the wound is sealed-over.  Scar massage: use Vitamin E (liquid inside Vitamin E tablet, or Vitamin E lotion) to gently massage the scar once per day for 30 seconds.

## 2022-04-21 NOTE — LETTER
"    4/21/2022         RE: Ifeoma Mahoney  [address] [address]        Dear Colleague,    Thank you for referring your patient, Ifeoma Mahoney, to the [facility]. Please see a copy of my visit note below.    Ifeoma Mahoney is here for follow up after carpal tunnel release on 4/6/22.      She presented to [facility] ED following 4/14/22 fall.    She slipped and caught herself in the shower with her right hand. This caused her cast to shift and Ifeoma was concerned that she had potentially disturbed the stitches. She describes an ongoing burning near her wound and soreness to the palmar thumb and the dorsal hand.    Exam:  Wound looked good, no evidence of infection according to my assistants.  Cannabis may have been \"on-board\" per report  Patient left prior to me seeing her.    Assessment:  Doing well status post carpal tunnel release     Plan:  Sutures were removed today.  AVS given describing instuctions.   Can return to repetitive use at 4-6 weeks.  Return to clinic 4 weeks or as needed      MARTHA Guzman MD  Dept. Orthopedic Surgery  [facility]      Patient apparently left approx 4:57 pm.  We did no see her go prior to seeing Dr Guzman. She was given post op instructions at time of check in as well as thru AVS and instructed in brace wear which was issued today.  Miguel Ángel Rodriguez OPA        Again, thank you for allowing me to participate in the care of your patient.        Sincerely,        Derek Guzman MD    "

## 2022-04-21 NOTE — PROGRESS NOTES
"Ifeoma Mahoney is here for follow up after carpal tunnel release on 4/6/22.      She presented to [facility] ED following 4/14/22 fall.    She slipped and caught herself in the shower with her right hand. This caused her cast to shift and Ifeoma was concerned that she had potentially disturbed the stitches. She describes an ongoing burning near her wound and soreness to the palmar thumb and the dorsal hand.    Exam:  Wound looked good, no evidence of infection according to my assistants.  Cannabis may have been \"on-board\" per report  Patient left prior to me seeing her.    Assessment:  Doing well status post carpal tunnel release     Plan:  Sutures were removed today.  AVS given describing instuctions.   Can return to repetitive use at 4-6 weeks.  Return to clinic 4 weeks or as needed      MARTHA Guzman MD  Dept. Orthopedic Surgery  [facility]    "

## 2022-05-07 ENCOUNTER — HEALTH MAINTENANCE LETTER (OUTPATIENT)
Age: 32
End: 2022-05-07

## 2022-05-24 DIAGNOSIS — M79.641 PAIN OF RIGHT HAND: ICD-10-CM

## 2022-05-24 RX ORDER — GABAPENTIN 300 MG/1
300 CAPSULE ORAL 2 TIMES DAILY
Qty: 60 CAPSULE | Refills: 0 | Status: SHIPPED | OUTPATIENT
Start: 2022-05-24 | End: 2022-07-26

## 2022-06-02 ENCOUNTER — DOCUMENTATION ONLY (OUTPATIENT)
Dept: ORTHOPEDICS | Facility: CLINIC | Age: 32
End: 2022-06-02
Payer: COMMERCIAL

## 2022-06-02 ENCOUNTER — DOCUMENTATION ONLY (OUTPATIENT)
Dept: PODIATRY | Facility: CLINIC | Age: 32
End: 2022-06-02
Payer: COMMERCIAL

## 2022-06-02 DIAGNOSIS — G56.01 CARPAL TUNNEL SYNDROME ON RIGHT: Primary | ICD-10-CM

## 2022-06-02 DIAGNOSIS — Z98.890 HISTORY OF CARPAL TUNNEL SURGERY OF RIGHT WRIST: ICD-10-CM

## 2022-07-25 ENCOUNTER — OFFICE VISIT (OUTPATIENT)
Dept: URGENT CARE | Facility: URGENT CARE | Age: 32
End: 2022-07-25
Payer: COMMERCIAL

## 2022-07-25 VITALS
BODY MASS INDEX: 37.35 KG/M2 | OXYGEN SATURATION: 100 % | DIASTOLIC BLOOD PRESSURE: 89 MMHG | TEMPERATURE: 97.6 F | WEIGHT: 210.8 LBS | HEIGHT: 63 IN | HEART RATE: 78 BPM | SYSTOLIC BLOOD PRESSURE: 133 MMHG

## 2022-07-25 DIAGNOSIS — S70.12XA CONTUSION OF LEFT HIP AND THIGH, INITIAL ENCOUNTER: ICD-10-CM

## 2022-07-25 DIAGNOSIS — N76.0 BV (BACTERIAL VAGINOSIS): Primary | ICD-10-CM

## 2022-07-25 DIAGNOSIS — R35.0 URINE FREQUENCY: ICD-10-CM

## 2022-07-25 DIAGNOSIS — Z11.3 SCREEN FOR STD (SEXUALLY TRANSMITTED DISEASE): ICD-10-CM

## 2022-07-25 DIAGNOSIS — S70.02XA CONTUSION OF LEFT HIP AND THIGH, INITIAL ENCOUNTER: ICD-10-CM

## 2022-07-25 DIAGNOSIS — N94.9 VAGINAL DISCOMFORT: ICD-10-CM

## 2022-07-25 DIAGNOSIS — B96.89 BV (BACTERIAL VAGINOSIS): Primary | ICD-10-CM

## 2022-07-25 LAB
ALBUMIN UR-MCNC: NEGATIVE MG/DL
APPEARANCE UR: CLEAR
BACTERIA #/AREA URNS HPF: ABNORMAL /HPF
BILIRUB UR QL STRIP: NEGATIVE
CLUE CELLS: PRESENT
COLOR UR AUTO: YELLOW
GLUCOSE UR STRIP-MCNC: NEGATIVE MG/DL
HGB UR QL STRIP: ABNORMAL
KETONES UR STRIP-MCNC: NEGATIVE MG/DL
LEUKOCYTE ESTERASE UR QL STRIP: NEGATIVE
NITRATE UR QL: NEGATIVE
PH UR STRIP: 6 [PH] (ref 5–7)
RBC #/AREA URNS AUTO: ABNORMAL /HPF
SP GR UR STRIP: 1.02 (ref 1–1.03)
SQUAMOUS #/AREA URNS AUTO: ABNORMAL /LPF
TRICHOMONAS, WET PREP: ABNORMAL
UROBILINOGEN UR STRIP-ACNC: 0.2 E.U./DL
WBC #/AREA URNS AUTO: ABNORMAL /HPF
WBC'S/HIGH POWER FIELD, WET PREP: ABNORMAL
YEAST, WET PREP: ABNORMAL

## 2022-07-25 PROCEDURE — 36415 COLL VENOUS BLD VENIPUNCTURE: CPT | Performed by: PHYSICIAN ASSISTANT

## 2022-07-25 PROCEDURE — 87210 SMEAR WET MOUNT SALINE/INK: CPT | Performed by: PHYSICIAN ASSISTANT

## 2022-07-25 PROCEDURE — 87389 HIV-1 AG W/HIV-1&-2 AB AG IA: CPT | Performed by: PHYSICIAN ASSISTANT

## 2022-07-25 PROCEDURE — 99214 OFFICE O/P EST MOD 30 MIN: CPT | Performed by: PHYSICIAN ASSISTANT

## 2022-07-25 PROCEDURE — 87491 CHLMYD TRACH DNA AMP PROBE: CPT | Performed by: PHYSICIAN ASSISTANT

## 2022-07-25 PROCEDURE — 81001 URINALYSIS AUTO W/SCOPE: CPT | Performed by: PHYSICIAN ASSISTANT

## 2022-07-25 PROCEDURE — 87591 N.GONORRHOEAE DNA AMP PROB: CPT | Performed by: PHYSICIAN ASSISTANT

## 2022-07-25 PROCEDURE — 86780 TREPONEMA PALLIDUM: CPT | Performed by: PHYSICIAN ASSISTANT

## 2022-07-25 RX ORDER — METRONIDAZOLE 500 MG/1
500 TABLET ORAL 2 TIMES DAILY
Qty: 14 TABLET | Refills: 0 | Status: SHIPPED | OUTPATIENT
Start: 2022-07-25 | End: 2022-08-01

## 2022-07-25 ASSESSMENT — ENCOUNTER SYMPTOMS
CONSTITUTIONAL NEGATIVE: 1
DIARRHEA: 0
FREQUENCY: 0
ENDOCRINE NEGATIVE: 1
RHINORRHEA: 0
NEUROLOGICAL NEGATIVE: 1
ACTIVITY CHANGE: 0
COUGH: 0
HEADACHES: 0
MUSCULOSKELETAL NEGATIVE: 1
DYSURIA: 1
MYALGIAS: 0
FATIGUE: 0
GASTROINTESTINAL NEGATIVE: 1
WEAKNESS: 0
PALPITATIONS: 0
DIZZINESS: 0
SORE THROAT: 0
FLANK PAIN: 0
LIGHT-HEADEDNESS: 0
NECK PAIN: 0
NECK STIFFNESS: 0
CARDIOVASCULAR NEGATIVE: 1
ADENOPATHY: 0
POLYDIPSIA: 0
SHORTNESS OF BREATH: 0
VOMITING: 0
FEVER: 0
CHILLS: 0
ABDOMINAL PAIN: 0
RESPIRATORY NEGATIVE: 1
HEMATURIA: 0
NAUSEA: 0

## 2022-07-25 NOTE — PROGRESS NOTES
Chief Complaint:    Chief Complaint   Patient presents with     Bruise On Hip     Pt said that she has a bruise on the left hip.      STD     ASSESSMENT     1. BV (bacterial vaginosis)    2. Contusion of left hip and thigh, initial encounter    3. Screen for STD (sexually transmitted disease)    4. Urine frequency    5. Vaginal discomfort         PLAN    Urinalysis discussed with patient  Wet prep was was positive for clue cells.  Rx for Flagyl today  Follow up with PCP in 2-3 days if symptoms are not improving.  Worrisome symptoms discussed with instructions to go to the ED.  Patient verbalized understanding and agreed with this plan.    Labs:     Results for orders placed or performed in visit on 07/25/22   UA Macro with Reflex to Micro and Culture - lab collect     Status: Abnormal    Specimen: Urine, Clean Catch   Result Value Ref Range    Color Urine Yellow Colorless, Straw, Light Yellow, Yellow    Appearance Urine Clear Clear    Glucose Urine Negative Negative mg/dL    Bilirubin Urine Negative Negative    Ketones Urine Negative Negative mg/dL    Specific Gravity Urine 1.020 1.003 - 1.035    Blood Urine Trace (A) Negative    pH Urine 6.0 5.0 - 7.0    Protein Albumin Urine Negative Negative mg/dL    Urobilinogen Urine 0.2 0.2, 1.0 E.U./dL    Nitrite Urine Negative Negative    Leukocyte Esterase Urine Negative Negative   Urine Microscopic     Status: Abnormal   Result Value Ref Range    Bacteria Urine Few (A) None Seen /HPF    RBC Urine 0-2 0-2 /HPF /HPF    WBC Urine 0-5 0-5 /HPF /HPF    Squamous Epithelials Urine Few (A) None Seen /LPF    Narrative    Urine Culture not indicated   Wet prep - lab collect     Status: Abnormal    Specimen: Vagina; Swab   Result Value Ref Range    Trichomonas Absent Absent    Yeast Absent Absent    Clue Cells Present (A) Absent    WBCs/high power field 2+ (A) None       Problem history    Patient Active Problem List   Diagnosis     Tobacco use disorder     PCOS (polycystic ovarian  syndrome)     Infertility, female     Abdominal pain     Dysmenorrhea     ASCUS on Pap smear     Pelvic pain in female     CARDIOVASCULAR SCREENING; LDL GOAL LESS THAN 160     Asthma     Controlled substance agreement signed     Conversion reaction     Depression     Dysplasia of cervix (uteri)     Pain in joint, pelvic region and thigh     Nausea & vomiting     Headache     PID (pelvic inflammatory disease)     Positive urine drug screen     Uterine leiomyoma     Need for Tdap vaccination     Carpal tunnel syndrome of right wrist       Current Meds    Current Outpatient Medications:      gabapentin (NEURONTIN) 300 MG capsule, Take 1 capsule (300 mg) by mouth 2 times daily, Disp: 60 capsule, Rfl: 0     gabapentin (NEURONTIN) 300 MG capsule, Take 1 capsule (300 mg) by mouth 3 times daily, Disp: 90 capsule, Rfl: 0     gabapentin (NEURONTIN) 300 MG capsule, Take 4 caps at bedtime , 3 in the mornings for 3 days, then start the 600 mg capsules., Disp: 21 capsule, Rfl: 0     metroNIDAZOLE (FLAGYL) 500 MG tablet, Take 1 tablet (500 mg) by mouth 2 times daily for 7 days, Disp: 14 tablet, Rfl: 0     traZODone (DESYREL) 50 MG tablet, TAKE 1 TABLET(50 MG) BY MOUTH EVERY NIGHT AS NEEDED FOR SLEEP, Disp: 30 tablet, Rfl: 11    Allergies  Allergies   Allergen Reactions     Benzoyl Peroxide Swelling       SUBJECTIVE    HPI:    Ifeoma Mahoney is a 32 year old female who presents for STD screening. She has had some dysuria.  She is unaware of any exposure to STDs.  she denies back pain, nausea, vomiting, fever and chills, flank pain, vaginal discharge, and vaginal odor.    Patient also mentions a bruise on the L hip.  She noticed a small bruise there yesterday.  She does not recall any acute injury.  The hip is not painful.   She is able to walk on the L leg.  No numbness, tingling, or dysfunction of the L leg.      ROS:      Review of Systems   Constitutional: Negative.  Negative for activity change, chills, fatigue and fever.    HENT: Negative for congestion, ear pain, rhinorrhea and sore throat.    Respiratory: Negative.  Negative for cough and shortness of breath.    Cardiovascular: Negative.  Negative for chest pain and palpitations.   Gastrointestinal: Negative.  Negative for abdominal pain, diarrhea, nausea and vomiting.   Endocrine: Negative.  Negative for polydipsia and polyuria.   Genitourinary: Positive for dysuria. Negative for flank pain, frequency, hematuria, pelvic pain, urgency, vaginal discharge and vaginal pain.   Musculoskeletal: Negative.  Negative for myalgias, neck pain and neck stiffness.   Allergic/Immunologic: Negative for immunocompromised state.   Neurological: Negative.  Negative for dizziness, weakness, light-headedness and headaches.   Hematological: Negative for adenopathy.       Family History   Family History   Problem Relation Age of Onset     Hypertension Mother         Social History  Social History     Socioeconomic History     Marital status: Single     Spouse name: no partner     Number of children: 0     Years of education: Not on file     Highest education level: Not on file   Occupational History     Employer: UNEMPLOYED   Tobacco Use     Smoking status: Former Smoker     Packs/day: 0.10     Types: Cigarettes     Quit date: 2022     Years since quittin.0     Smokeless tobacco: Never Used     Tobacco comment: smokes 7 cigarettes per day, interested in quitting   Vaping Use     Vaping Use: Never used   Substance and Sexual Activity     Alcohol use: Yes     Comment: social     Drug use: No     Sexual activity: Yes     Partners: Male     Birth control/protection: None   Other Topics Concern     Parent/sibling w/ CABG, MI or angioplasty before 65F 55M? Not Asked   Social History Narrative     Not on file     Social Determinants of Health     Financial Resource Strain: Not on file   Food Insecurity: Not on file   Transportation Needs: Not on file   Physical Activity: Not on file   Stress: Not on  "file   Social Connections: Not on file   Intimate Partner Violence: Not on file   Housing Stability: Not on file           OBJECTIVE     Vital signs noted and reviewed by Gio Cruz PA-C  /89 (BP Location: Left arm, Patient Position: Sitting, Cuff Size: Adult Regular)   Pulse 78   Temp 97.6  F (36.4  C) (Tympanic)   Ht 1.6 m (5' 3\")   Wt 95.6 kg (210 lb 12.8 oz)   LMP 07/26/2021 (Exact Date)   SpO2 100%   BMI 37.34 kg/m       Physical Exam  Vitals and nursing note reviewed.   Constitutional:       General: She is not in acute distress.     Appearance: Normal appearance. She is well-developed. She is not ill-appearing, toxic-appearing or diaphoretic.   HENT:      Head: Normocephalic and atraumatic.      Right Ear: Tympanic membrane and external ear normal.      Left Ear: Tympanic membrane and external ear normal.   Eyes:      Pupils: Pupils are equal, round, and reactive to light.   Cardiovascular:      Rate and Rhythm: Normal rate and regular rhythm.      Heart sounds: Normal heart sounds. No murmur heard.    No friction rub. No gallop.   Pulmonary:      Effort: Pulmonary effort is normal. No respiratory distress.      Breath sounds: Normal breath sounds. No wheezing or rales.   Chest:      Chest wall: No tenderness.   Abdominal:      General: Bowel sounds are normal. There is no distension.      Palpations: Abdomen is soft. Abdomen is not rigid. There is no mass.      Tenderness: There is no abdominal tenderness. There is no guarding or rebound. Negative signs include Cortez's sign and McBurney's sign.   Musculoskeletal:      Cervical back: Normal range of motion and neck supple.        Legs:       Comments: Roughly 2 cm in diameter contusion.   Lymphadenopathy:      Cervical: No cervical adenopathy.   Skin:     General: Skin is warm and dry.   Neurological:      Mental Status: She is alert and oriented to person, place, and time.      Cranial Nerves: No cranial nerve deficit.      Deep Tendon " Reflexes: Reflexes are normal and symmetric.   Psychiatric:         Behavior: Behavior normal. Behavior is cooperative.         Thought Content: Thought content normal.         Judgment: Judgment normal.             Gio Cruz PA-C  7/25/2022, 4:46 PM

## 2022-07-26 ENCOUNTER — MYC REFILL (OUTPATIENT)
Dept: ORTHOPEDICS | Facility: CLINIC | Age: 32
End: 2022-07-26

## 2022-07-26 DIAGNOSIS — M79.641 PAIN OF RIGHT HAND: ICD-10-CM

## 2022-07-26 LAB
C TRACH DNA SPEC QL NAA+PROBE: NEGATIVE
HIV 1+2 AB+HIV1 P24 AG SERPL QL IA: NONREACTIVE
N GONORRHOEA DNA SPEC QL NAA+PROBE: NEGATIVE
T PALLIDUM AB SER QL: NONREACTIVE

## 2022-07-27 RX ORDER — GABAPENTIN 300 MG/1
300 CAPSULE ORAL 2 TIMES DAILY
Qty: 60 CAPSULE | Refills: 0 | Status: SHIPPED | OUTPATIENT
Start: 2022-07-27 | End: 2022-10-03

## 2022-10-03 ENCOUNTER — MYC REFILL (OUTPATIENT)
Dept: ORTHOPEDICS | Facility: CLINIC | Age: 32
End: 2022-10-03

## 2022-10-03 DIAGNOSIS — M79.641 PAIN OF RIGHT HAND: ICD-10-CM

## 2022-10-05 RX ORDER — GABAPENTIN 300 MG/1
300 CAPSULE ORAL 2 TIMES DAILY
Qty: 60 CAPSULE | Refills: 0 | Status: SHIPPED | OUTPATIENT
Start: 2022-10-05 | End: 2023-01-10

## 2022-12-05 DIAGNOSIS — G56.01 CARPAL TUNNEL SYNDROME OF RIGHT WRIST: ICD-10-CM

## 2022-12-05 RX ORDER — GABAPENTIN 300 MG/1
CAPSULE ORAL
Qty: 21 CAPSULE | Refills: 0 | Status: CANCELLED | OUTPATIENT
Start: 2022-12-05

## 2022-12-06 NOTE — TELEPHONE ENCOUNTER
Tried calling pt three times at the number she has listed. Unable to contact pt to come in for a follow up appt so this medication has been denied, until the pt can be seen in clinic.  Mila Jackman CMA 12/6/2022 8:04 AM

## 2022-12-28 ENCOUNTER — PATIENT OUTREACH (OUTPATIENT)
Dept: CARE COORDINATION | Facility: CLINIC | Age: 32
End: 2022-12-28

## 2022-12-28 NOTE — LETTER
Dear Ifeoma,    I am a clinic care coordinator who works with Jumana Eaton MD with the [facility]. I wanted to introduce myself and provide you with my contact information for you to be able to call me with any questions or concerns. Below is a description of clinic care coordination and how I can further assist you.       The clinic care coordination team is made up of a registered nurse, , financial resource worker and community health worker who understand the health care system. The goal of clinic care coordination is to help you manage your health and improve access to the health care system. Our team works alongside your provider to assist you in determining your health and social needs. We can help you obtain health care and community resources, providing you with necessary information and education. We can work with you through any barriers and develop a care plan that helps coordinate and strengthen the communication between you and your care team.    Please feel free to contact me with any questions or concerns regarding care coordination and what we can offer.      We are focused on providing you with the highest-quality healthcare experience possible.    Sincerely,     Stephany Dimas RN, BSN, PHN Care Coordinator  [facility]   Phone: [phone]

## 2022-12-28 NOTE — PROGRESS NOTES
Clinic Care Coordination Contact  [facility]    Clinical Data: Patient meets criteria as a potential care coordination candidate. Patient has a 63% risk of unplanned admission or ER visit.    Outreach attempted x 2. Rice a recording that sounds like a business. Only phone number on file for patient and is invalid.  Unable to leave a VM as well.   Plan: Care Coordinator will send care coordination introduction letter with care coordinator contact information and explanation of care coordination services via [employer]. Care Coordinator will do no further outreaches at this time.    Stephany Dimas RN, BSN, PHN Care Coordinator  [facility]   Phone: [phone]

## 2023-01-05 ENCOUNTER — MYC REFILL (OUTPATIENT)
Dept: FAMILY MEDICINE | Facility: CLINIC | Age: 33
End: 2023-01-05

## 2023-01-05 DIAGNOSIS — M79.641 PAIN OF RIGHT HAND: ICD-10-CM

## 2023-01-05 DIAGNOSIS — G56.01 CARPAL TUNNEL SYNDROME OF RIGHT WRIST: ICD-10-CM

## 2023-01-05 RX ORDER — GABAPENTIN 300 MG/1
CAPSULE ORAL
Qty: 21 CAPSULE | Refills: 0 | OUTPATIENT
Start: 2023-01-05

## 2023-01-05 RX ORDER — GABAPENTIN 300 MG/1
300 CAPSULE ORAL 3 TIMES DAILY
Qty: 90 CAPSULE | Refills: 0 | OUTPATIENT
Start: 2023-01-05

## 2023-01-06 NOTE — TELEPHONE ENCOUNTER
I have not prescribed this medication for the patient before as it came from her orthopedic doctor.  And I have not seen her in a year and a half.  So she could certainly set up a virtual visit with me, even an E-visit, otherwise it needs to come from Dr. Guzman

## 2023-01-08 ENCOUNTER — HEALTH MAINTENANCE LETTER (OUTPATIENT)
Age: 33
End: 2023-01-08

## 2023-01-10 RX ORDER — GABAPENTIN 300 MG/1
300 CAPSULE ORAL 2 TIMES DAILY
Qty: 60 CAPSULE | Refills: 0 | Status: SHIPPED | OUTPATIENT
Start: 2023-01-10 | End: 2023-06-13

## 2023-02-08 ENCOUNTER — MYC REFILL (OUTPATIENT)
Dept: FAMILY MEDICINE | Facility: CLINIC | Age: 33
End: 2023-02-08

## 2023-02-08 ENCOUNTER — TELEPHONE (OUTPATIENT)
Dept: URGENT CARE | Facility: URGENT CARE | Age: 33
End: 2023-02-08

## 2023-02-08 ENCOUNTER — OFFICE VISIT (OUTPATIENT)
Dept: URGENT CARE | Facility: URGENT CARE | Age: 33
End: 2023-02-08
Payer: COMMERCIAL

## 2023-02-08 VITALS
TEMPERATURE: 98.8 F | WEIGHT: 202 LBS | SYSTOLIC BLOOD PRESSURE: 110 MMHG | HEART RATE: 104 BPM | DIASTOLIC BLOOD PRESSURE: 80 MMHG | OXYGEN SATURATION: 98 % | BODY MASS INDEX: 35.78 KG/M2 | RESPIRATION RATE: 14 BRPM

## 2023-02-08 DIAGNOSIS — T19.2XXA RETAINED TAMPON, INITIAL ENCOUNTER: Primary | ICD-10-CM
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
## 2023-02-08 DIAGNOSIS — R82.90 ABNORMAL FINDING ON URINALYSIS: Primary | ICD-10-CM

## 2023-02-08 DIAGNOSIS — N76.0 BV (BACTERIAL VAGINOSIS): ICD-10-CM

## 2023-02-08 DIAGNOSIS — G56.01 CARPAL TUNNEL SYNDROME OF RIGHT WRIST: ICD-10-CM

## 2023-02-08 DIAGNOSIS — B96.89 BV (BACTERIAL VAGINOSIS): ICD-10-CM

## 2023-02-08 DIAGNOSIS — N92.1 MENORRHAGIA WITH IRREGULAR CYCLE: ICD-10-CM

## 2023-02-08 LAB
ALBUMIN UR-MCNC: 100 MG/DL
APPEARANCE UR: CLEAR
BACTERIA #/AREA URNS HPF: ABNORMAL /HPF
BASOPHILS # BLD AUTO: 0 10E3/UL (ref 0–0.2)
BASOPHILS NFR BLD AUTO: 0 %
BILIRUB UR QL STRIP: ABNORMAL
CLUE CELLS: PRESENT
COLOR UR AUTO: YELLOW
EOSINOPHIL # BLD AUTO: 0.2 10E3/UL (ref 0–0.7)
EOSINOPHIL NFR BLD AUTO: 2 %
ERYTHROCYTE [DISTWIDTH] IN BLOOD BY AUTOMATED COUNT: 13.5 % (ref 10–15)
GLUCOSE UR STRIP-MCNC: NEGATIVE MG/DL
HCG UR QL: NEGATIVE
HCT VFR BLD AUTO: 38.9 % (ref 35–47)
HGB BLD-MCNC: 13 G/DL (ref 11.7–15.7)
HGB UR QL STRIP: ABNORMAL
IMM GRANULOCYTES # BLD: 0 10E3/UL
IMM GRANULOCYTES NFR BLD: 0 %
KETONES UR STRIP-MCNC: ABNORMAL MG/DL
LEUKOCYTE ESTERASE UR QL STRIP: ABNORMAL
LYMPHOCYTES # BLD AUTO: 2.3 10E3/UL (ref 0.8–5.3)
LYMPHOCYTES NFR BLD AUTO: 22 %
MCH RBC QN AUTO: 30.7 PG (ref 26.5–33)
MCHC RBC AUTO-ENTMCNC: 33.4 G/DL (ref 31.5–36.5)
MCV RBC AUTO: 92 FL (ref 78–100)
MONOCYTES # BLD AUTO: 0.9 10E3/UL (ref 0–1.3)
MONOCYTES NFR BLD AUTO: 8 %
MUCOUS THREADS #/AREA URNS LPF: PRESENT /LPF
NEUTROPHILS # BLD AUTO: 7 10E3/UL (ref 1.6–8.3)
NEUTROPHILS NFR BLD AUTO: 68 %
NITRATE UR QL: NEGATIVE
PH UR STRIP: 6 [PH] (ref 5–7)
PLATELET # BLD AUTO: 351 10E3/UL (ref 150–450)
RBC # BLD AUTO: 4.23 10E6/UL (ref 3.8–5.2)
RBC #/AREA URNS AUTO: ABNORMAL /HPF
SP GR UR STRIP: >=1.03 (ref 1–1.03)
SQUAMOUS #/AREA URNS AUTO: ABNORMAL /LPF
TRICHOMONAS, WET PREP: ABNORMAL
UROBILINOGEN UR STRIP-ACNC: 1 E.U./DL
WBC # BLD AUTO: 10.4 10E3/UL (ref 4–11)
WBC #/AREA URNS AUTO: ABNORMAL /HPF
WBC'S/HIGH POWER FIELD, WET PREP: ABNORMAL
YEAST, WET PREP: ABNORMAL

## 2023-02-08 PROCEDURE — 36415 COLL VENOUS BLD VENIPUNCTURE: CPT | Performed by: PHYSICIAN ASSISTANT

## 2023-02-08 PROCEDURE — 87491 CHLMYD TRACH DNA AMP PROBE: CPT | Performed by: PHYSICIAN ASSISTANT

## 2023-02-08 PROCEDURE — 87591 N.GONORRHOEAE DNA AMP PROB: CPT | Performed by: PHYSICIAN ASSISTANT

## 2023-02-08 PROCEDURE — 85025 COMPLETE CBC W/AUTO DIFF WBC: CPT | Performed by: PHYSICIAN ASSISTANT

## 2023-02-08 PROCEDURE — 81001 URINALYSIS AUTO W/SCOPE: CPT | Performed by: PHYSICIAN ASSISTANT

## 2023-02-08 PROCEDURE — 81025 URINE PREGNANCY TEST: CPT | Performed by: PHYSICIAN ASSISTANT

## 2023-02-08 PROCEDURE — 87086 URINE CULTURE/COLONY COUNT: CPT | Performed by: PHYSICIAN ASSISTANT

## 2023-02-08 PROCEDURE — 87210 SMEAR WET MOUNT SALINE/INK: CPT | Performed by: PHYSICIAN ASSISTANT

## 2023-02-08 PROCEDURE — 99214 OFFICE O/P EST MOD 30 MIN: CPT | Performed by: PHYSICIAN ASSISTANT

## 2023-02-08 RX ORDER — SULFAMETHOXAZOLE/TRIMETHOPRIM 800-160 MG
1 TABLET ORAL 2 TIMES DAILY
Qty: 14 TABLET | Refills: 0 | Status: SHIPPED | OUTPATIENT
Start: 2023-02-08 | End: 2023-02-15

## 2023-02-08 RX ORDER — METRONIDAZOLE 7.5 MG/G
1 GEL VAGINAL
Qty: 70 G | Refills: 0 | Status: SHIPPED | OUTPATIENT
Start: 2023-02-09 | End: 2023-06-13

## 2023-02-08 RX ORDER — METRONIDAZOLE 500 MG/1
500 TABLET ORAL 2 TIMES DAILY
Qty: 14 TABLET | Refills: 0 | Status: SHIPPED | OUTPATIENT
Start: 2023-02-08 | End: 2023-02-15

## 2023-02-08 RX ORDER — MEDROXYPROGESTERONE ACETATE 10 MG
10 TABLET ORAL DAILY
Qty: 7 TABLET | Refills: 0 | Status: SHIPPED | OUTPATIENT
Start: 2023-02-08 | End: 2023-02-15

## 2023-02-08 RX ORDER — GABAPENTIN 300 MG/1
300 CAPSULE ORAL 3 TIMES DAILY
Qty: 90 CAPSULE | Refills: 0 | Status: SHIPPED | OUTPATIENT
Start: 2023-02-08 | End: 2023-06-13

## 2023-02-08 ASSESSMENT — ENCOUNTER SYMPTOMS
CARDIOVASCULAR NEGATIVE: 1
DIARRHEA: 0
FATIGUE: 0
SHORTNESS OF BREATH: 0
ABDOMINAL PAIN: 0
HEMATOCHEZIA: 0
RESPIRATORY NEGATIVE: 1
FEVER: 0
CONSTIPATION: 0
COUGH: 0
FLANK PAIN: 0
HEMATURIA: 0
HEARTBURN: 0
NAUSEA: 0
FREQUENCY: 0
VOMITING: 0
CHEST TIGHTNESS: 0
GASTROINTESTINAL NEGATIVE: 1
WHEEZING: 0
CHILLS: 0
PALPITATIONS: 0
DYSURIA: 0

## 2023-02-08 NOTE — LETTER
[facility] [address] [address]    2023    Re: Ifeoma Mahoney  [address] [address]  [phone] (home) [phone] (work)    : 1990      To Whom It May Concern:      Ifeoma Mahoney was seen in urgent care clinic today and is unable to work today due to her symptoms.  Please feel free to contact me via phone if you have any questions or concerns.        Sincerely,      Melyssa Soriano PA-C

## 2023-02-08 NOTE — PROGRESS NOTES
michael Dia is a 32 year old, presenting for the following health issues:  Vaginal Problem (Pain and suggests there may still be a tampon she can not remove herself./Per pt last month, bled for 6 weeks, had 2 periods in one month. Hx of PCOS, unusual bleeding and pain, and shooting through pads and tampons. ), Refill Request (Neurontin request), and STD (Pt suggests she may have an STD.)    HPI   Menstrual Concern  Onset/Duration: 1month  Description:   Duration of bleeding episodes: 10days  Describe bleeding/flow:   Clots: YES  Number of pads/day: 10.  Has been changing her tampons and pad every hour and thinks that she may have forgotten a tampon a couple of days ago.        Cramping: moderate  Accompanying Signs & Symptoms:  Lightheadedness: YES  Temperature intolerance: No  Nosebleeds/Easy bruising: No  Vaginal Discharge: YES- with odor  Acne: No  Change in body hair: No  History:  No LMP recorded.  Previous normal periods: no   Contraceptive use: NO  Sexually active: YES  Any bleeding after intercourse: No  Abnormal PAP Smears: unknown  Precipitating or alleviating factors: None  Therapies tried and outcome: rest, fluids, tylenol with minimal relief    Patient Active Problem List   Diagnosis     Tobacco use disorder     PCOS (polycystic ovarian syndrome)     Infertility, female     Abdominal pain     Dysmenorrhea     ASCUS on Pap smear     Pelvic pain in female     CARDIOVASCULAR SCREENING; LDL GOAL LESS THAN 160     Asthma     Controlled substance agreement signed     Conversion reaction     Depression     Dysplasia of cervix (uteri)     Pain in joint, pelvic region and thigh     Nausea & vomiting     Headache     PID (pelvic inflammatory disease)     Positive urine drug screen     Uterine leiomyoma     Need for Tdap vaccination     Carpal tunnel syndrome of right wrist     Current Outpatient Medications   Medication     gabapentin (NEURONTIN) 300 MG capsule     gabapentin (NEURONTIN) 300 MG  capsule     traZODone (DESYREL) 50 MG tablet     gabapentin (NEURONTIN) 300 MG capsule     No current facility-administered medications for this visit.        Allergies   Allergen Reactions     Benzoyl Peroxide Swelling       Review of Systems   Constitutional: Negative for chills, fatigue and fever.   Respiratory: Negative.  Negative for cough, chest tightness, shortness of breath and wheezing.    Cardiovascular: Negative.  Negative for chest pain, palpitations and peripheral edema.   Gastrointestinal: Negative.  Negative for abdominal pain, constipation, diarrhea, heartburn, hematochezia, nausea and vomiting.   Genitourinary: Positive for pelvic pain and vaginal bleeding. Negative for dysuria, flank pain, frequency, hematuria, urgency and vaginal discharge.   All other systems reviewed and are negative.           Objective    /80   Pulse 104   Temp 98.8  F (37.1  C) (Tympanic)   Resp 14   Wt 91.6 kg (202 lb)   SpO2 98%   BMI 35.78 kg/m    Body mass index is 35.78 kg/m .  Physical Exam  Vitals and nursing note reviewed. Exam conducted with a chaperone present.   Constitutional:       General: She is not in acute distress.     Appearance: Normal appearance. She is obese. She is not ill-appearing.   Genitourinary:     General: Normal vulva.      Labia:         Right: No rash, tenderness or lesion.         Left: No rash, tenderness or lesion.       Urethra: No prolapse, urethral pain or urethral swelling.      Vagina: Vaginal discharge and bleeding present. No lesions.      Cervix: No discharge, friability, lesion, erythema or cervical bleeding.      Uterus: Normal. Tender. Not deviated, not enlarged and no uterine prolapse.       Adnexa: Right adnexa normal and left adnexa normal.        Right: No mass or tenderness.          Left: No mass or tenderness.        Comments: Foreign body with blood removed from vaginal canal.  Skin:     Capillary Refill: Capillary refill takes less than 2 seconds.    Neurological:      Mental Status: She is alert and oriented to person, place, and time.   Psychiatric:         Mood and Affect: Mood normal.         Behavior: Behavior normal.         Thought Content: Thought content normal.         Judgment: Judgment normal.       Results for orders placed or performed in visit on 02/08/23 (from the past 24 hour(s))   Wet prep - lab collect    Specimen: Vagina; Swab   Result Value Ref Range    Trichomonas Absent Absent    Yeast Absent Absent    Clue Cells Present (A) Absent    WBCs/high power field 2+ (A) None   UA Macro with Reflex to Micro and Culture - lab collect    Specimen: Urine, Clean Catch   Result Value Ref Range    Color Urine Yellow Colorless, Straw, Light Yellow, Yellow    Appearance Urine Clear Clear    Glucose Urine Negative Negative mg/dL    Bilirubin Urine Moderate (A) Negative    Ketones Urine Trace (A) Negative mg/dL    Specific Gravity Urine >=1.030 1.003 - 1.035    Blood Urine Large (A) Negative    pH Urine 6.0 5.0 - 7.0    Protein Albumin Urine 100 (A) Negative mg/dL    Urobilinogen Urine 1.0 0.2, 1.0 E.U./dL    Nitrite Urine Negative Negative    Leukocyte Esterase Urine Trace (A) Negative   HCG Qual, Urine ([id number])   Result Value Ref Range    hCG Urine Qualitative Negative Negative   CBC with platelets and differential    Narrative    The following orders were created for panel order CBC with platelets and differential.  Procedure                               Abnormality         Status                     ---------                               -----------         ------                     CBC with platelets and d...[[id number]]                      Final result                 Please view results for these tests on the individual orders.   CBC with platelets and differential   Result Value Ref Range    WBC Count 10.4 4.0 - 11.0 10e3/uL    RBC Count 4.23 3.80 - 5.20 10e6/uL    Hemoglobin 13.0 11.7 - 15.7 g/dL    Hematocrit 38.9 35.0 - 47.0 %    MCV 92 78  - 100 fL    MCH 30.7 26.5 - 33.0 pg    MCHC 33.4 31.5 - 36.5 g/dL    RDW 13.5 10.0 - 15.0 %    Platelet Count 351 150 - 450 10e3/uL    % Neutrophils 68 %    % Lymphocytes 22 %    % Monocytes 8 %    % Eosinophils 2 %    % Basophils 0 %    % Immature Granulocytes 0 %    Absolute Neutrophils 7.0 1.6 - 8.3 10e3/uL    Absolute Lymphocytes 2.3 0.8 - 5.3 10e3/uL    Absolute Monocytes 0.9 0.0 - 1.3 10e3/uL    Absolute Eosinophils 0.2 0.0 - 0.7 10e3/uL    Absolute Basophils 0.0 0.0 - 0.2 10e3/uL    Absolute Immature Granulocytes 0.0 <=0.4 10e3/uL         Assessment/Plan:  Retained tampon, initial encounter:  This was removed in clinic without complications.  -     REMOVE FOREIGN BODY SIMPLE    BV (bacterial vaginosis):  Wet prep showed clue cells present, will also check gc/chlamydia.  Will treat with metronidazole X1week.  No ETOH while on medications due to disulfuram reaction.  Avoid foreign body insertion, scented personal care products and frequent baths.  Recheck in clinic if symptoms worsen or if symptoms do not improve.    -     Chlamydia trachomatis/Neisseria gonorrhoeae by PCR - Clinic Collect  -     Wet prep - lab collect; Future  -     UA Macro with Reflex to Micro and Culture - lab collect; Future  -     Wet prep - lab collect  -     UA Macro with Reflex to Micro and Culture - lab collect  -     metroNIDAZOLE (FLAGYL) 500 MG tablet; Take 1 tablet (500 mg) by mouth 2 times daily for 7 days  -     metroNIDAZOLE (METROGEL) 0.75 % vaginal gel; Place 1 applicator (5 g) vaginally twice a week    Menorrhagia with irregular cycle:  Will give provera and then send to GYN.  UA and UPT were normal or negative.  -     HCG Qual, Urine ([id number]); Future  -     CBC with platelets and differential; Future  -     HCG Qual, Urine ([id number])  -     CBC with platelets and differential  -     Ob/Gyn Referral  -     medroxyPROGESTERone (PROVERA) 10 MG tablet; Take 1 tablet (10 mg) by mouth daily for 7 days        Melyssa Soriano PA-C

## 2023-02-09 LAB
C TRACH DNA SPEC QL PROBE+SIG AMP: NEGATIVE
N GONORRHOEA DNA SPEC QL NAA+PROBE: NEGATIVE

## 2023-02-09 NOTE — TELEPHONE ENCOUNTER
UA and micro is suspicious for UTI and will empirically treat with bactrim DS X7days.  Will send for UC to help guide abx treatment.  Recheck in clinic if symptoms worsen or if symptoms do not improve.    Melyssa Soriano PA-C

## 2023-02-10 LAB — BACTERIA UR CULT: NORMAL

## 2023-02-13 ENCOUNTER — OFFICE VISIT (OUTPATIENT)
Dept: OBGYN | Facility: CLINIC | Age: 33
End: 2023-02-13
Payer: COMMERCIAL

## 2023-02-13 VITALS
WEIGHT: 211 LBS | BODY MASS INDEX: 37.38 KG/M2 | DIASTOLIC BLOOD PRESSURE: 90 MMHG | HEART RATE: 99 BPM | SYSTOLIC BLOOD PRESSURE: 138 MMHG | OXYGEN SATURATION: 100 %

## 2023-02-13 DIAGNOSIS — N97.9 FEMALE INFERTILITY: Primary | ICD-10-CM

## 2023-02-13 DIAGNOSIS — E28.2 PCOS (POLYCYSTIC OVARIAN SYNDROME): ICD-10-CM

## 2023-02-13 DIAGNOSIS — Z13.1 SCREENING FOR DIABETES MELLITUS: ICD-10-CM

## 2023-02-13 PROCEDURE — 99214 OFFICE O/P EST MOD 30 MIN: CPT | Performed by: OBSTETRICS & GYNECOLOGY

## 2023-02-13 NOTE — NURSING NOTE
Vitals:    02/13/23 1510 02/13/23 1557   BP: (!) 159/113 (!) 138/90   BP Location: Left arm Left arm   Patient Position: Chair Chair   Cuff Size: Adult Regular Adult Large   Pulse: 99    SpO2: 100%    Weight: 95.7 kg (211 lb)

## 2023-02-13 NOTE — PROGRESS NOTES
"This 31 y/o female, , LMP 2023, presents for follow up from the ED due to abnormal bleeding.  She was started on the BCP and took this daily from  to the first week of 2023.  Her first period afterwards seemed normal to her and lasted 5 days.  She is hoping to conceive soon and has expressed interest in an infertility evaluation in the past but a work up has not been done recently.  A HSG was performed over 10 years ago and she states that results were normal.  However, it will need to be redone since it was checked so long ago.  She was treated for both gonorrhea and chlamydia when in her early 20s and had 2 pregnancies prior to this and 1 afterwards.  She will also need labwork drawn on cycle day #3 as discussed today.  Her last US on 2022 at the ED demonstrated a 0.9 cm endometrial stripe with PCOS findings so does not need to be rechecked today.  She was prescribed Provera po but never started this.  She continues to smoke 2 cigs/day x the past 15 years so was advised to quit and prefers the \"cold turkey\" approach.  Her  had a child with his previous wife who is now age 4.  Her initial BP reading was elevated at 159/113 but she stated that she rushed here since she was running late.  Her repeat reading improved.  BP (!) 138/90 (BP Location: Left arm, Patient Position: Chair, Cuff Size: Adult Large)   Pulse 99   Wt 95.7 kg (211 lb)   LMP  (LMP Unknown)   SpO2 100%   Breastfeeding No   BMI 37.38 kg/m    ROS:  10 systems were reviewed and the positives were listed under problems.  PE:  General- Healthy-appearing female in no acute distress  Eyes- No scleral injection or icterus  ENT- Mucus membranes moist  CV- No noticeable edema in extremities  Lymph- No noticeable cervical adenopathy  Respiratory- Non-labored breathing  Skin- No suspicious lesions or rashes visualized  Psych- Normal affect  Assessment - Secondary infertility, PCOS, smoking cessation consult  Plan - I " "advised a recheck HSG due to her hx of GC and chlamydia so will schedule this in the future after a review of her BBTs and OTC ovulation kit results.  She will need a recheck glucose since her last value was elevated at 184 on 12/26/2022 but she was not in a fasting state per the patient.  Since she is not fasting this afternoon, will place a future order.  If elevated, then will refer to FP and consider initiating Metformin for treatment, given her PCOS hx.  She was provided BBT graphs with both verbal and written instructions on use.  She is to return for review of the first completed cycle and will check labwork on cycle day #3.  She prefers to quit smoking \"cold turkey.\"  All her questions and concerns were addressed.  30 minutes were spent today in chart review, the patient visit, review of tests, and documentation in regard to the issues noted above.   "

## 2023-02-13 NOTE — PATIENT INSTRUCTIONS
If you have any questions regarding your visit, Please contact your care team.    [employer] [facility]: [phone]    To Schedule an Appointment 24/7  Call: [phone] HOURS TELEPHONE NUMBER   DO. Keisha Lee -Surgery Scheduler  Analilia - Surgery Scheduler    MELISSA Sun, MELISSA Heath, MELISSA   [place]  Wednesday and Friday  8:30 a.m-5:00 p.m  [place]-Temporary  Monday 8:30 a.m-5:00 p.m  Typical Surgery day:  Tuesday [facility]  [address] [place] [address] [phone] Phone  [phone] Fax    Imaging Scheduling-All Locations [phone]    [facility]  [address] [place] [address]     Urgent Care locations:  [facility] Monday-Friday   10 am - 8 pm  Saturday and Sunday   9 am - 5 pm [phone]   **Surgeries** Our Surgery Schedulers will contact you to schedule. If you do not receive a call within 3 business days, please call [phone].    [facility] Labor and Delivery:  [phone]    If you need a medication refill, please contact your pharmacy. Please allow 3 business days for your refill to be completed.  As always, Thank you for trusting us with your healthcare needs!  see additional instructions from your care team below

## 2023-03-26 DIAGNOSIS — G47.00 INSOMNIA, UNSPECIFIED TYPE: ICD-10-CM

## 2023-03-27 RX ORDER — TRAZODONE HYDROCHLORIDE 50 MG/1
TABLET, FILM COATED ORAL
Qty: 30 TABLET | Refills: 0 | Status: SHIPPED | OUTPATIENT
Start: 2023-03-27 | End: 2023-05-17

## 2023-03-30 NOTE — TELEPHONE ENCOUNTER
Refilled    Consent (Spinal Accessory)/Introductory Paragraph: The rationale for Mohs was explained to the patient and consent was obtained. The risks, benefits and alternatives to therapy were discussed in detail. Specifically, the risks of damage to the spinal accessory nerve, infection, scarring, bleeding, prolonged wound healing, incomplete removal, allergy to anesthesia, and recurrence were addressed. Prior to the procedure, the treatment site was clearly identified and confirmed by the patient. All components of Universal Protocol/PAUSE Rule completed.

## 2023-04-24 ENCOUNTER — ANCILLARY PROCEDURE (OUTPATIENT)
Dept: GENERAL RADIOLOGY | Facility: CLINIC | Age: 33
End: 2023-04-24
Attending: EMERGENCY MEDICINE
Payer: COMMERCIAL

## 2023-04-24 ENCOUNTER — OFFICE VISIT (OUTPATIENT)
Dept: URGENT CARE | Facility: URGENT CARE | Age: 33
End: 2023-04-24
Payer: COMMERCIAL

## 2023-04-24 VITALS
HEART RATE: 100 BPM | DIASTOLIC BLOOD PRESSURE: 95 MMHG | BODY MASS INDEX: 37.08 KG/M2 | OXYGEN SATURATION: 100 % | SYSTOLIC BLOOD PRESSURE: 151 MMHG | TEMPERATURE: 98 F | WEIGHT: 209.3 LBS

## 2023-04-24 DIAGNOSIS — J06.9 UPPER RESPIRATORY TRACT INFECTION, UNSPECIFIED TYPE: Primary | ICD-10-CM

## 2023-04-24 DIAGNOSIS — H53.8 BLURRED VISION: ICD-10-CM

## 2023-04-24 DIAGNOSIS — Z32.02 PREGNANCY TEST NEGATIVE: ICD-10-CM

## 2023-04-24 DIAGNOSIS — R35.0 URINARY FREQUENCY: ICD-10-CM

## 2023-04-24 DIAGNOSIS — J06.9 UPPER RESPIRATORY TRACT INFECTION, UNSPECIFIED TYPE: ICD-10-CM

## 2023-04-24 DIAGNOSIS — R51.9 ACUTE NONINTRACTABLE HEADACHE, UNSPECIFIED HEADACHE TYPE: ICD-10-CM

## 2023-04-24 LAB
ALBUMIN UR-MCNC: NEGATIVE MG/DL
AMORPH CRY #/AREA URNS HPF: ABNORMAL /HPF
ANION GAP SERPL CALCULATED.3IONS-SCNC: 1 MMOL/L (ref 3–14)
APPEARANCE UR: ABNORMAL
BACTERIA #/AREA URNS HPF: ABNORMAL /HPF
BILIRUB UR QL STRIP: NEGATIVE
BUN SERPL-MCNC: 11 MG/DL (ref 7–30)
CALCIUM SERPL-MCNC: 8.6 MG/DL (ref 8.5–10.1)
CHLORIDE BLD-SCNC: 110 MMOL/L (ref 94–109)
CO2 SERPL-SCNC: 28 MMOL/L (ref 20–32)
COHGB MFR BLD: 3.4 % (ref 0–2)
COLOR UR AUTO: YELLOW
CREAT SERPL-MCNC: 0.74 MG/DL (ref 0.52–1.04)
ERYTHROCYTE [DISTWIDTH] IN BLOOD BY AUTOMATED COUNT: 12.9 % (ref 10–15)
GFR SERPL CREATININE-BSD FRML MDRD: >90 ML/MIN/1.73M2
GLUCOSE BLD-MCNC: 103 MG/DL (ref 60–99)
GLUCOSE BLD-MCNC: 103 MG/DL (ref 70–99)
GLUCOSE UR STRIP-MCNC: NEGATIVE MG/DL
HBA1C MFR BLD: 5.7 % (ref 0–5.6)
HCG UR QL: NEGATIVE
HCT VFR BLD AUTO: 40.4 % (ref 35–47)
HGB BLD-MCNC: 13.6 G/DL (ref 11.7–15.7)
HGB UR QL STRIP: ABNORMAL
KETONES UR STRIP-MCNC: NEGATIVE MG/DL
LEUKOCYTE ESTERASE UR QL STRIP: NEGATIVE
MCH RBC QN AUTO: 30.8 PG (ref 26.5–33)
MCHC RBC AUTO-ENTMCNC: 33.7 G/DL (ref 31.5–36.5)
MCV RBC AUTO: 91 FL (ref 78–100)
NITRATE UR QL: NEGATIVE
PH UR STRIP: 8.5 [PH] (ref 5–7)
PLATELET # BLD AUTO: 315 10E3/UL (ref 150–450)
POTASSIUM BLD-SCNC: 3.7 MMOL/L (ref 3.4–5.3)
RBC # BLD AUTO: 4.42 10E6/UL (ref 3.8–5.2)
RBC #/AREA URNS AUTO: ABNORMAL /HPF
SODIUM SERPL-SCNC: 139 MMOL/L (ref 133–144)
SP GR UR STRIP: 1.02 (ref 1–1.03)
UROBILINOGEN UR STRIP-ACNC: 0.2 E.U./DL
WBC # BLD AUTO: 8.1 10E3/UL (ref 4–11)
WBC #/AREA URNS AUTO: ABNORMAL /HPF

## 2023-04-24 PROCEDURE — 83036 HEMOGLOBIN GLYCOSYLATED A1C: CPT | Performed by: EMERGENCY MEDICINE

## 2023-04-24 PROCEDURE — 71046 X-RAY EXAM CHEST 2 VIEWS: CPT | Mod: TC | Performed by: RADIOLOGY

## 2023-04-24 PROCEDURE — 36415 COLL VENOUS BLD VENIPUNCTURE: CPT | Performed by: EMERGENCY MEDICINE

## 2023-04-24 PROCEDURE — 80048 BASIC METABOLIC PNL TOTAL CA: CPT | Performed by: EMERGENCY MEDICINE

## 2023-04-24 PROCEDURE — 82375 ASSAY CARBOXYHB QUANT: CPT | Performed by: EMERGENCY MEDICINE

## 2023-04-24 PROCEDURE — 82947 ASSAY GLUCOSE BLOOD QUANT: CPT | Mod: 59 | Performed by: EMERGENCY MEDICINE

## 2023-04-24 PROCEDURE — 99215 OFFICE O/P EST HI 40 MIN: CPT | Performed by: EMERGENCY MEDICINE

## 2023-04-24 PROCEDURE — 81001 URINALYSIS AUTO W/SCOPE: CPT | Performed by: EMERGENCY MEDICINE

## 2023-04-24 PROCEDURE — 85027 COMPLETE CBC AUTOMATED: CPT | Performed by: EMERGENCY MEDICINE

## 2023-04-24 PROCEDURE — 81025 URINE PREGNANCY TEST: CPT | Performed by: EMERGENCY MEDICINE

## 2023-04-24 RX ORDER — AZITHROMYCIN 250 MG/1
TABLET, FILM COATED ORAL
Qty: 6 TABLET | Refills: 0 | Status: SHIPPED | OUTPATIENT
Start: 2023-04-24 | End: 2023-04-29

## 2023-04-24 RX ORDER — BENZONATATE 100 MG/1
100 CAPSULE ORAL 3 TIMES DAILY PRN
Qty: 25 CAPSULE | Refills: 0 | Status: SHIPPED | OUTPATIENT
Start: 2023-04-24 | End: 2023-06-13
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
## 2023-05-16 DIAGNOSIS — G47.00 INSOMNIA, UNSPECIFIED TYPE: ICD-10-CM

## 2023-05-17 ENCOUNTER — MYC REFILL (OUTPATIENT)
Dept: FAMILY MEDICINE | Facility: CLINIC | Age: 33
End: 2023-05-17
Payer: COMMERCIAL

## 2023-05-17 DIAGNOSIS — G47.00 INSOMNIA, UNSPECIFIED TYPE: ICD-10-CM

## 2023-05-17 RX ORDER — TRAZODONE HYDROCHLORIDE 50 MG/1
TABLET, FILM COATED ORAL
Qty: 30 TABLET | Refills: 0 | OUTPATIENT
Start: 2023-05-17

## 2023-05-17 NOTE — TELEPHONE ENCOUNTER
Refill denied to the pharmacy.   If patient schedules an appointment we can provide a johnnie refill.   Not seen by me in almost 2 years

## 2023-05-18 RX ORDER — TRAZODONE HYDROCHLORIDE 50 MG/1
50 TABLET, FILM COATED ORAL
Qty: 30 TABLET | Refills: 0 | Status: SHIPPED | OUTPATIENT
Start: 2023-05-18 | End: 2023-06-13

## 2023-05-22 ENCOUNTER — TELEPHONE (OUTPATIENT)
Dept: FAMILY MEDICINE | Facility: CLINIC | Age: 33
End: 2023-05-22
Payer: COMMERCIAL

## 2023-05-22 NOTE — TELEPHONE ENCOUNTER
Patient Quality Outreach    Patient is due for the following:   Asthma  -  ACT needed  Cervical Cancer Screening - PAP Needed  Physical       Topic Date Due     COVID-19 Vaccine (1) Never done     Diptheria Tetanus Pertussis (DTAP/TDAP/TD) Vaccine (9 - Td or Tdap) 06/20/2022     Flu Vaccine (1) 09/01/2022       Next Steps:   Patient has upcoming appointment, these items will be addressed at that time.    Type of outreach:    Sent [facility] message.      Questions for provider review:    None           Radha Ghotra

## 2023-06-02 ENCOUNTER — HEALTH MAINTENANCE LETTER (OUTPATIENT)
Age: 33
End: 2023-06-02

## 2023-06-13 ENCOUNTER — VIRTUAL VISIT (OUTPATIENT)
Dept: FAMILY MEDICINE | Facility: CLINIC | Age: 33
End: 2023-06-13
Payer: COMMERCIAL

## 2023-06-13 ENCOUNTER — MYC MEDICAL ADVICE (OUTPATIENT)
Dept: FAMILY MEDICINE | Facility: CLINIC | Age: 33
End: 2023-06-13

## 2023-06-13 DIAGNOSIS — G47.00 INSOMNIA, UNSPECIFIED TYPE: ICD-10-CM

## 2023-06-13 DIAGNOSIS — E28.2 PCOS (POLYCYSTIC OVARIAN SYNDROME): ICD-10-CM

## 2023-06-13 DIAGNOSIS — R05.1 ACUTE COUGH: Primary | ICD-10-CM

## 2023-06-13 DIAGNOSIS — R10.32 LLQ ABDOMINAL PAIN: Primary | ICD-10-CM

## 2023-06-13 DIAGNOSIS — G56.01 CARPAL TUNNEL SYNDROME OF RIGHT WRIST: ICD-10-CM

## 2023-06-13 DIAGNOSIS — R10.32 LLQ ABDOMINAL PAIN: ICD-10-CM

## 2023-06-13 PROCEDURE — 99214 OFFICE O/P EST MOD 30 MIN: CPT | Mod: 95 | Performed by: FAMILY MEDICINE

## 2023-06-13 RX ORDER — GABAPENTIN 300 MG/1
300 CAPSULE ORAL 3 TIMES DAILY
Qty: 270 CAPSULE | Refills: 1 | Status: SHIPPED | OUTPATIENT
Start: 2023-06-13

## 2023-06-13 RX ORDER — TRAZODONE HYDROCHLORIDE 100 MG/1
50-100 TABLET ORAL
Qty: 90 TABLET | Refills: 1 | Status: SHIPPED | OUTPATIENT
Start: 2023-06-13 | End: 2023-12-05

## 2023-06-13 RX ORDER — GABAPENTIN 300 MG/1
300 CAPSULE ORAL 3 TIMES DAILY
Qty: 90 CAPSULE | Refills: 1 | Status: SHIPPED | OUTPATIENT
Start: 2023-06-13 | End: 2023-06-13

## 2023-06-13 RX ORDER — OXYCODONE HYDROCHLORIDE 5 MG/1
5 TABLET ORAL EVERY 6 HOURS PRN
Qty: 12 TABLET | Refills: 0 | Status: SHIPPED | OUTPATIENT
Start: 2023-06-13 | End: 2023-06-20

## 2023-06-13 ASSESSMENT — ASTHMA QUESTIONNAIRES
ACT_TOTALSCORE: 17
QUESTION_4 LAST FOUR WEEKS HOW OFTEN HAVE YOU USED YOUR RESCUE INHALER OR NEBULIZER MEDICATION (SUCH AS ALBUTEROL): TWO OR THREE TIMES PER WEEK
QUESTION_2 LAST FOUR WEEKS HOW OFTEN HAVE YOU HAD SHORTNESS OF BREATH: ONCE OR TWICE A WEEK
QUESTION_1 LAST FOUR WEEKS HOW MUCH OF THE TIME DID YOUR ASTHMA KEEP YOU FROM GETTING AS MUCH DONE AT WORK, SCHOOL OR AT HOME: SOME OF THE TIME
QUESTION_5 LAST FOUR WEEKS HOW WOULD YOU RATE YOUR ASTHMA CONTROL: SOMEWHAT CONTROLLED
QUESTION_3 LAST FOUR WEEKS HOW OFTEN DID YOUR ASTHMA SYMPTOMS (WHEEZING, COUGHING, SHORTNESS OF BREATH, CHEST TIGHTNESS OR PAIN) WAKE YOU UP AT NIGHT OR EARLIER THAN USUAL IN THE MORNING: ONCE OR TWICE
ACT_TOTALSCORE: 17

## 2023-06-13 NOTE — PROGRESS NOTES
"Ifeoma is a 33 year old who is being evaluated via a billable telephone visit.      What phone number would you like to be contacted at? [phone]  How would you like to obtain your AVS? Ceferino    Distant Location (provider location):  On-site    Assessment & Plan     LLQ abdominal pain  Visit with patient today to follow-up on ER visit for some lower abdominal pain.  Full records reviewed with patient and through care everywhere.  She does carry diagnosis of PCOS and there was some blood seen in the urine but not a lot of free pelvic fluid.  It still is possible that what she was dealing with was a ruptured ovarian cyst.  Seems to be getting a little bit better but .  So I will send in a small amount of pain medication and encouraged her to follow-up with her GYN provider on what the best way to address this is.  - oxyCODONE (ROXICODONE) 5 MG tablet; Take 1 tablet (5 mg) by mouth every 6 hours as needed for pain    PCOS (polycystic ovarian syndrome)  As above.  There was some concern about elevated blood sugar.  A1c was 5.7.  So another was talk about trying metformin at 1 point.    Carpal tunnel syndrome of right wrist  Stable on current regimen.  Continue same plan and routine follow-up.   - gabapentin (NEURONTIN) 300 MG capsule; Take 1 capsule (300 mg) by mouth 3 times daily    Insomnia, unspecified type  Still dealing with some issues of sleep.  We will increase the trazodone to 100 mg nightly.  - traZODone (DESYREL) 100 MG tablet; Take 0.5-1 tablets ( mg) by mouth every evening as needed for sleep       BMI:   Estimated body mass index is 37.08 kg/m  as calculated from the following:    Height as of 7/25/22: 1.6 m (5' 3\").    Weight as of 4/24/23: 94.9 kg (209 lb 4.8 oz).   Weight management plan: Discussed healthy diet and exercise guidelines    See Patient Instructions    Jumana Eaton MD  [facility]   Ifeoma is a 33 year old, presenting " for the following health issues:  Medication Request (Check up and refill ) and Abdominal Pain         View : No data to display.              History of Present Illness       Reason for visit:  Med check and refill, abdominal pain    She eats 2-3 servings of fruits and vegetables daily.She consumes 3 sweetened beverage(s) daily.She exercises with enough effort to increase her heart rate 30 to 60 minutes per day.  She exercises with enough effort to increase her heart rate 3 or less days per week. She is missing 2 dose(s) of medications per week.  She is not taking prescribed medications regularly due to other.       Pain History:  When did you first notice your pain? 3 days    Have you seen anyone else for your pain? Yes -   How has your pain affected your ability to work? 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 Abdominal/Flank Pain  Onset/Duration: 3 days   Description:   Character: Sharp and Stabbing  Location: right lower quadrant left lower quadrant  Radiation: None and Back  Intensity: none   Progression of Symptoms:  same  Accompanying Signs & Symptoms:  Fever/Chills: No  Gas/Bloating: No  Nausea: No  Vomitting: No  Diarrhea: No  Constipation: No  Dysuria or Hematuria: No  History:   Trauma: No  Previous similar pain: YES  Previous tests done: none  Precipitating factors:   Does the pain change with:     Food: No    Bowel Movement: No    Urination: No   Other factors:  No  Therapies tried and outcome: Tylenol   No LMP recorded.        Visit today to follow-up on abdominal symptoms as above.  Reviewed outside records.      Review of Systems   Constitutional, HEENT, cardiovascular, pulmonary, gi and gu systems are negative, except as otherwise noted.      Objective           Vitals:  No vitals were obtained today due to virtual visit.    Physical Exam   healthy, alert and no distress  PSYCH: Alert and oriented times 3; coherent  speech, normal   rate and volume, able to articulate logical thoughts, able   to abstract reason, no tangential thoughts, no hallucinations   or delusions  Her affect is normal  RESP: No cough, no audible wheezing, able to talk in full sentences  Remainder of exam unable to be completed due to telephone visits    Past labs reviewed with the patient.  Reviewed outside imaging.            Phone call duration: 14 minutes

## 2023-06-14 ENCOUNTER — NURSE TRIAGE (OUTPATIENT)
Dept: FAMILY MEDICINE | Facility: CLINIC | Age: 33
End: 2023-06-14
Payer: COMMERCIAL

## 2023-06-14 NOTE — TELEPHONE ENCOUNTER
"Patient calling in with concerns of breast symptoms, wondering if she needs a mammogram.    Patient states that a day or two ago, she was leaning over to pick something up off the floor and hit her left breast against a chair. Patient states her breast starting hurting at that time (more the top of her breast) and hasn't improved since. Patient states that breast feels \"hot\" and quite painful, 7/10. Patient states that she hasn't tried anything for the pain other than an ice pack, which she feels did somewhat help temporarily but not much. Patient states breast does not appear to be red but isn't sure, unable to tell if she has any new lumps or if it is more swollen than usual. Patient denies fever at this time, but hasn't checked with a thermometer. Patient also states that 2 weeks ago, she noticed that she was having nipple tenderness and had some nipple discharge for only a day. Patient not sure if this is related - patient states that she does not have any children and doesn't believe she's pregnant. No open areas on breast.    Patient wondering if she needs a mammogram today - has never had one but isn't confident with her self-breast exams. Patient would like to be seen by a provider today. Did let patient know that writer is not sure about mammogram availability, unsure if there are ways to get a same day mammogram. Patient verbalized understanding, would more like to be seen by a provider to get their opinion on what's going on.    Informed patient that we do have virtual appts available today, but would likely recommend being seen in-person, and unfortunately no availability in clinic today, but we do have availability tomorrow if she would like - would recommend urgent care if patient would like to be seen same day today. Patient verbalized understanding, will plan to go to urgent care vs appt tomorrow. No further questions or concerns.      BRIDGETT Casillas, RN  [facility] " "Clinic      Reason for Disposition    Breast looks infected (spreading redness, feels hot or painful to touch) and no fever    Additional Information    Negative: Chest pain    Negative: Breastfeeding questions about baby    Negative: Breastfeeding questions about mother (breast symptoms or feeling sick)    Negative: Breastfeeding questions about mother's medicines and diet    Negative: Postpartum breast pain and swelling, not breastfeeding    Negative: Small spot, skin growth or mole    Negative: Patient sounds very sick or weak to the triager    Negative: SEVERE breast pain and fever > 103 F  (39.4 C)    Negative: Breast looks infected (spreading redness, feels hot or painful to touch) and fever    Answer Assessment - Initial Assessment Questions  1. SYMPTOM: \"What's the main symptom you're concerned about?\"  (e.g., lump, pain, rash, nipple discharge)      Pain, nipple discharge  2. LOCATION: \"Where is the pain located?\"      Left breast, more at the top of the breast but does feel like the whole breast  3. ONSET: \"When did pain  start?\"      Yesterday or day before, leaned over and hit breast against chair - noticed pain at that time  4. PRIOR HISTORY: \"Do you have any history of prior problems with your breasts?\" (e.g., lumps, cancer, fibrocystic breast disease)      No  5. CAUSE: \"What do you think is causing this symptom?\"      Not sure, wondering if she needs mammogram  6. OTHER SYMPTOMS: \"Do you have any other symptoms?\" (e.g., fever, breast pain, redness or rash, nipple discharge)      Breast pain, breast feels hot, 2 weeks ago had some discharge from nipple and tenderness   7. PREGNANCY-BREASTFEEDING: \"Is there any chance you are pregnant?\" \"When was your last menstrual period?\" \"Are you breastfeeding?\"      No, no kids    Protocols used: BREAST SYMPTOMS-A-[place]      "

## 2023-06-20 RX ORDER — OXYCODONE AND ACETAMINOPHEN 7.5; 325 MG/1; MG/1
1 TABLET ORAL 3 TIMES DAILY PRN
Qty: 12 TABLET | Refills: 0 | Status: SHIPPED | OUTPATIENT
Start: 2023-06-20 | End: 2023-06-23

## 2023-06-20 NOTE — TELEPHONE ENCOUNTER
See also nurse triage encounter 6/14. Patient was unable to make it to UC that day, and is requesting appointment with PCP regarding breast concerns. Appointment scheduled for 6/23. Advised patient to be seen sooner in UC or ED if new or worsening symptoms develop. Patient in agreement with plan. No further questions or concerns.    MELISSA Barclay  [facility] Primary Care Triage

## 2023-06-23 ENCOUNTER — OFFICE VISIT (OUTPATIENT)
Dept: FAMILY MEDICINE | Facility: CLINIC | Age: 33
End: 2023-06-23
Payer: COMMERCIAL

## 2023-06-23 VITALS
SYSTOLIC BLOOD PRESSURE: 137 MMHG | RESPIRATION RATE: 13 BRPM | DIASTOLIC BLOOD PRESSURE: 93 MMHG | HEIGHT: 64 IN | TEMPERATURE: 98.9 F | OXYGEN SATURATION: 99 % | WEIGHT: 209.2 LBS | HEART RATE: 90 BPM | BODY MASS INDEX: 35.71 KG/M2

## 2023-06-23 DIAGNOSIS — L02.92 BOIL: ICD-10-CM

## 2023-06-23 DIAGNOSIS — E28.2 PCOS (POLYCYSTIC OVARIAN SYNDROME): ICD-10-CM

## 2023-06-23 DIAGNOSIS — R10.32 LLQ ABDOMINAL PAIN: ICD-10-CM

## 2023-06-23 DIAGNOSIS — R05.1 ACUTE COUGH: ICD-10-CM

## 2023-06-23 DIAGNOSIS — N64.4 BREAST PAIN, LEFT: Primary | ICD-10-CM

## 2023-06-23 PROCEDURE — 99214 OFFICE O/P EST MOD 30 MIN: CPT | Performed by: FAMILY MEDICINE

## 2023-06-23 RX ORDER — OXYCODONE AND ACETAMINOPHEN 7.5; 325 MG/1; MG/1
1 TABLET ORAL 3 TIMES DAILY PRN
Qty: 12 TABLET | Refills: 0 | Status: SHIPPED | OUTPATIENT
Start: 2023-06-23 | End: 2023-09-26

## 2023-06-23 RX ORDER — DOXYCYCLINE HYCLATE 100 MG
100 TABLET ORAL 2 TIMES DAILY
Qty: 20 TABLET | Refills: 0 | Status: SHIPPED | OUTPATIENT
Start: 2023-06-23 | End: 2023-07-03

## 2023-06-23 ASSESSMENT — PAIN SCALES - GENERAL: PAINLEVEL: SEVERE PAIN (7)

## 2023-06-23 NOTE — PROGRESS NOTES
Assessment & Plan     Breast pain, left  Patient has been noting some left-sided breast pain for a few weeks and even noted a discharge at 1 point from the nipple.  Has not repeated itself.  Is not bloody.  Does not note any lumps.  We performed a breast examination today which was unremarkable and I instructed patient on how to do this.  But with her concerns we will set up a diagnostic mammogram and ultrasound.  - MA Diagnostic Digital Bilateral; Future  - US Breast Bilateral Limited 1-3 Quadrants; Future    PCOS (polycystic ovarian syndrome)  Carries a diagnosis of PCOS and was working with GYN on this.  I discussed that hormonal irregularities like this can lead to some breast symptoms and I encouraged her to be in touch with her GYN providers on this.    Acute cough  Couple of weeks of a hacking dry cough.  We are starting some antibiotics to help with some boils in the groin area help that as well.  - doxycycline hyclate (VIBRA-TABS) 100 MG tablet; Take 1 tablet (100 mg) by mouth 2 times daily for 10 days    Boil  Has had a few recurrent boils down in her pubic region.  I know some of her family members who suffer from hidradenitis suppurativa but this does not seem to be the pattern of the patient yet.  I did discuss this as a potential issue.  - doxycycline hyclate (VIBRA-TABS) 100 MG tablet; Take 1 tablet (100 mg) by mouth 2 times daily for 10 days    LLQ abdominal pain  Patient been having some lower abdominal pain likely related to some ovarian cysts.  I will put a refill on her medication but I am not planning on this being an ongoing prescription and made her aware of this.  - oxyCODONE-acetaminophen (PERCOCET) 7.5-325 MG per tablet; Take 1 tablet by mouth 3 times daily as needed for pain         See Patient Instructions    Jumana Eaton MD  [facility]    Perez Dia is a 33 year old, presenting for the following health issues:  Breast Problem        6/23/2023  "    3:15 PM   Additional Questions   Roomed by Stephanie     History of Present Illness       Reason for visit:  Med check and refill, abdominal pain    She eats 2-3 servings of fruits and vegetables daily.She consumes 3 sweetened beverage(s) daily.She exercises with enough effort to increase her heart rate 30 to 60 minutes per day.  She exercises with enough effort to increase her heart rate 3 or less days per week. She is missing 2 dose(s) of medications per week.  She is not taking prescribed medications regularly due to other.       Concern - Breast concern  (left)  Onset: A few weeks ago   Description: Sharp Pain comes and goes , Nipple Discharge, Pain to the touch   Intensity: severe  Progression of Symptoms:  same  Accompanying Signs & Symptoms:   Previous history of similar problem: Pain spreads up to left shoulder blade   Precipitating factors:        Worsened by: Touching on it, Leaning on it, Bras too tight   Alleviating factors:        Improved by: None  Therapies tried and outcome: None    Here today for symptoms as above.    - Ongoing cough as well    Review of Systems   Constitutional, HEENT, cardiovascular, pulmonary, gi and gu systems are negative, except as otherwise noted.      Objective    BP (!) 137/93 (BP Location: Right arm, Patient Position: Sitting, Cuff Size: Adult Large)   Pulse 90   Temp 98.9  F (37.2  C) (Oral)   Resp 13   Ht 1.62 m (5' 3.78\")   Wt 94.9 kg (209 lb 3.2 oz)   LMP  (LMP Unknown)   SpO2 99%   BMI 36.16 kg/m    Body mass index is 36.16 kg/m .  Physical Exam   Alert, pleasant, upbeat, and in no apparent discomfort.  S1 and S2 normal, no murmurs, clicks, gallops or rubs. Regular rate and rhythm. Chest is clear; no wheezes or rales. No edema or JVD.  Breasts are symmetric.  No dominant, discrete, fixed  or suspicious masses are noted.  Mild symmetric fibrocystic densities are noted in both upper outer quadrants. No skin or nipple changes or axillary nodes. Self exam is taught " and encouraged.   Past labs reviewed with the patient.

## 2023-06-26 RX ORDER — CODEINE PHOSPHATE AND GUAIFENESIN 10; 100 MG/5ML; MG/5ML
1 SOLUTION ORAL EVERY 4 HOURS PRN
Qty: 118 ML | Refills: 0 | Status: SHIPPED | OUTPATIENT
Start: 2023-06-26

## 2023-06-26 NOTE — TELEPHONE ENCOUNTER
Patient had an appointment with provider on 6/23/23 and acute cough was discussed. Routing to provider to review and advise.     Aruna Hoskins, ELLYN, RN   [facility]

## 2023-07-03 ENCOUNTER — MYC REFILL (OUTPATIENT)
Dept: FAMILY MEDICINE | Facility: CLINIC | Age: 33
End: 2023-07-03
Payer: COMMERCIAL

## 2023-07-03 DIAGNOSIS — R10.32 LLQ ABDOMINAL PAIN: ICD-10-CM

## 2023-07-03 RX ORDER — OXYCODONE AND ACETAMINOPHEN 7.5; 325 MG/1; MG/1
1 TABLET ORAL 3 TIMES DAILY PRN
Qty: 12 TABLET | Refills: 0 | OUTPATIENT
Start: 2023-07-03

## 2023-07-03 NOTE — TELEPHONE ENCOUNTER
Refill declined.  Per Dr. Eaton's note this was not to be a long-term medication and a one-time prescription was sent in at time of visit

## 2023-09-08 ENCOUNTER — E-VISIT (OUTPATIENT)
Dept: FAMILY MEDICINE | Facility: CLINIC | Age: 33
End: 2023-09-08
Payer: COMMERCIAL

## 2023-09-08 DIAGNOSIS — F32.A DEPRESSION, UNSPECIFIED DEPRESSION TYPE: Primary | ICD-10-CM

## 2023-09-08 PROCEDURE — 99207 PR NON-BILLABLE SERV PER CHARTING: CPT | Performed by: FAMILY MEDICINE

## 2023-09-15 ASSESSMENT — ANXIETY QUESTIONNAIRES
7. FEELING AFRAID AS IF SOMETHING AWFUL MIGHT HAPPEN: NEARLY EVERY DAY
3. WORRYING TOO MUCH ABOUT DIFFERENT THINGS: NEARLY EVERY DAY
IF YOU CHECKED OFF ANY PROBLEMS ON THIS QUESTIONNAIRE, HOW DIFFICULT HAVE THESE PROBLEMS MADE IT FOR YOU TO DO YOUR WORK, TAKE CARE OF THINGS AT HOME, OR GET ALONG WITH OTHER PEOPLE: EXTREMELY DIFFICULT
2. NOT BEING ABLE TO STOP OR CONTROL WORRYING: NEARLY EVERY DAY
4. TROUBLE RELAXING: NEARLY EVERY DAY
GAD7 TOTAL SCORE: 19
1. FEELING NERVOUS, ANXIOUS, OR ON EDGE: NEARLY EVERY DAY
GAD7 TOTAL SCORE: 19
5. BEING SO RESTLESS THAT IT IS HARD TO SIT STILL: SEVERAL DAYS
6. BECOMING EASILY ANNOYED OR IRRITABLE: NEARLY EVERY DAY

## 2023-09-15 ASSESSMENT — PATIENT HEALTH QUESTIONNAIRE - PHQ9
SUM OF ALL RESPONSES TO PHQ QUESTIONS 1-9: 25
10. IF YOU CHECKED OFF ANY PROBLEMS, HOW DIFFICULT HAVE THESE PROBLEMS MADE IT FOR YOU TO DO YOUR WORK, TAKE CARE OF THINGS AT HOME, OR GET ALONG WITH OTHER PEOPLE: EXTREMELY DIFFICULT
SUM OF ALL RESPONSES TO PHQ QUESTIONS 1-9: 25

## 2023-09-25 ENCOUNTER — MYC REFILL (OUTPATIENT)
Dept: FAMILY MEDICINE | Facility: CLINIC | Age: 33
End: 2023-09-25
Payer: COMMERCIAL

## 2023-09-25 DIAGNOSIS — R05.1 ACUTE COUGH: ICD-10-CM

## 2023-09-25 DIAGNOSIS — R10.32 LLQ ABDOMINAL PAIN: ICD-10-CM

## 2023-09-25 RX ORDER — OXYCODONE AND ACETAMINOPHEN 7.5; 325 MG/1; MG/1
1 TABLET ORAL 3 TIMES DAILY PRN
Qty: 12 TABLET | Refills: 0 | Status: CANCELLED | OUTPATIENT
Start: 2023-09-25

## 2023-09-25 RX ORDER — CODEINE PHOSPHATE AND GUAIFENESIN 10; 100 MG/5ML; MG/5ML
1 SOLUTION ORAL EVERY 4 HOURS PRN
Qty: 118 ML | Refills: 0 | Status: CANCELLED | OUTPATIENT
Start: 2023-09-25

## 2023-09-25 NOTE — TELEPHONE ENCOUNTER
Please try to get patient set up with a same-day slot with me.  Either in person or virtual.  Then if that is scheduled in a reasonable time I can send in the oxycodone but not the cough medicine.  That would not be appropriate

## 2023-09-26 DIAGNOSIS — R10.32 LLQ ABDOMINAL PAIN: ICD-10-CM

## 2023-09-26 RX ORDER — OXYCODONE AND ACETAMINOPHEN 7.5; 325 MG/1; MG/1
1 TABLET ORAL 3 TIMES DAILY PRN
Qty: 12 TABLET | Refills: 0 | Status: SHIPPED | OUTPATIENT
Start: 2023-09-26 | End: 2023-10-06

## 2023-10-05 ENCOUNTER — OFFICE VISIT (OUTPATIENT)
Dept: URGENT CARE | Facility: URGENT CARE | Age: 33
End: 2023-10-05
Payer: COMMERCIAL

## 2023-10-05 VITALS
SYSTOLIC BLOOD PRESSURE: 122 MMHG | RESPIRATION RATE: 12 BRPM | OXYGEN SATURATION: 98 % | HEART RATE: 130 BPM | BODY MASS INDEX: 33.72 KG/M2 | WEIGHT: 195.1 LBS | DIASTOLIC BLOOD PRESSURE: 88 MMHG | TEMPERATURE: 99.5 F

## 2023-10-05 DIAGNOSIS — B37.31 CANDIDIASIS OF VAGINA: ICD-10-CM

## 2023-10-05 DIAGNOSIS — B96.89 BV (BACTERIAL VAGINOSIS): Primary | ICD-10-CM

## 2023-10-05 DIAGNOSIS — Z11.3 SCREEN FOR STD (SEXUALLY TRANSMITTED DISEASE): ICD-10-CM

## 2023-10-05 DIAGNOSIS — N76.0 BV (BACTERIAL VAGINOSIS): Primary | ICD-10-CM

## 2023-10-05 LAB
ALBUMIN UR-MCNC: 30 MG/DL
APPEARANCE UR: CLEAR
BACTERIA #/AREA URNS HPF: ABNORMAL /HPF
BILIRUB UR QL STRIP: ABNORMAL
CLUE CELLS: PRESENT
COLOR UR AUTO: YELLOW
GLUCOSE UR STRIP-MCNC: NEGATIVE MG/DL
HGB UR QL STRIP: ABNORMAL
HYALINE CASTS #/AREA URNS LPF: ABNORMAL /LPF
KETONES UR STRIP-MCNC: ABNORMAL MG/DL
LEUKOCYTE ESTERASE UR QL STRIP: NEGATIVE
MUCOUS THREADS #/AREA URNS LPF: PRESENT /LPF
NITRATE UR QL: NEGATIVE
PH UR STRIP: 5 [PH] (ref 5–7)
RBC #/AREA URNS AUTO: ABNORMAL /HPF
SP GR UR STRIP: >=1.03 (ref 1–1.03)
SQUAMOUS #/AREA URNS AUTO: ABNORMAL /LPF
TRICHOMONAS, WET PREP: ABNORMAL
UROBILINOGEN UR STRIP-ACNC: 0.2 E.U./DL
WBC #/AREA URNS AUTO: ABNORMAL /HPF
WBC'S/HIGH POWER FIELD, WET PREP: ABNORMAL
YEAST, WET PREP: PRESENT

## 2023-10-05 PROCEDURE — 86803 HEPATITIS C AB TEST: CPT | Performed by: PHYSICIAN ASSISTANT

## 2023-10-05 PROCEDURE — 36415 COLL VENOUS BLD VENIPUNCTURE: CPT | Performed by: PHYSICIAN ASSISTANT

## 2023-10-05 PROCEDURE — 99214 OFFICE O/P EST MOD 30 MIN: CPT | Performed by: PHYSICIAN ASSISTANT

## 2023-10-05 PROCEDURE — 81001 URINALYSIS AUTO W/SCOPE: CPT | Performed by: PHYSICIAN ASSISTANT

## 2023-10-05 PROCEDURE — 87210 SMEAR WET MOUNT SALINE/INK: CPT | Performed by: PHYSICIAN ASSISTANT

## 2023-10-05 PROCEDURE — 86780 TREPONEMA PALLIDUM: CPT | Performed by: PHYSICIAN ASSISTANT

## 2023-10-05 PROCEDURE — 87491 CHLMYD TRACH DNA AMP PROBE: CPT | Performed by: PHYSICIAN ASSISTANT

## 2023-10-05 PROCEDURE — 87340 HEPATITIS B SURFACE AG IA: CPT | Performed by: PHYSICIAN ASSISTANT

## 2023-10-05 PROCEDURE — 87591 N.GONORRHOEAE DNA AMP PROB: CPT | Performed by: PHYSICIAN ASSISTANT

## 2023-10-05 PROCEDURE — 87389 HIV-1 AG W/HIV-1&-2 AB AG IA: CPT | Performed by: PHYSICIAN ASSISTANT

## 2023-10-05 RX ORDER — METRONIDAZOLE 7.5 MG/G
1 GEL VAGINAL DAILY
Qty: 70 G | Refills: 0 | Status: SHIPPED | OUTPATIENT
Start: 2023-10-05 | End: 2023-10-10

## 2023-10-05 RX ORDER — METRONIDAZOLE 500 MG/1
500 TABLET ORAL 2 TIMES DAILY
Qty: 14 TABLET | Refills: 0 | Status: SHIPPED | OUTPATIENT
Start: 2023-10-05 | End: 2023-10-05

## 2023-10-05 RX ORDER — FLUCONAZOLE 150 MG/1
150 TABLET ORAL
Qty: 3 TABLET | Refills: 0 | Status: SHIPPED | OUTPATIENT
Start: 2023-10-05 | End: 2023-10-12

## 2023-10-05 ASSESSMENT — ENCOUNTER SYMPTOMS
CHILLS: 0
DYSURIA: 0
HEMATOCHEZIA: 0
VOMITING: 0
RESPIRATORY NEGATIVE: 1
CONSTIPATION: 0
CARDIOVASCULAR NEGATIVE: 1
FLANK PAIN: 0
HEMATURIA: 0
CHEST TIGHTNESS: 0
FEVER: 0
PALPITATIONS: 0
FREQUENCY: 0
SHORTNESS OF BREATH: 0
FATIGUE: 0
NAUSEA: 0
DIARRHEA: 0
ABDOMINAL PAIN: 0
HEARTBURN: 0
GASTROINTESTINAL NEGATIVE: 1
WHEEZING: 0
COUGH: 0

## 2023-10-05 NOTE — LETTER
October 5, 2023      Ifeoma Mahoney  [address]   [place] [address]        To Whom It May Concern:    Ifeoma Mahoney was seen in urgent care clinic today and was unable to work today due to her symptoms.  Please feel free to contact me via phone if you have any questions or concerns.        Sincerely,      Melyssa See SYD Soriano

## 2023-10-05 NOTE — PROGRESS NOTES
Subjective   Ifeoma is a 33 year old, presenting for the following health issues:  Urgent Care, Vaginal Problem (Have vaginal discomfort for awhile, Want test for BV, yeast infection, and STD ), and STD    HPI  Vaginal Symptoms  Onset/Duration: 4days  Description:  Vaginal Discharge: none but describes discomfort.  Would like STD testing as well.  Itching (Pruritis): No  Burning sensation:  No  Odor: No  Accompanying Signs & Symptoms:  Urinary symptoms: No  Abdominal pain: No  Fever: No  History:   Sexually active: YES  New Partner: No  Possibility of Pregnancy:  No  Recent antibiotic use: No  Previous vaginitis issues: YES- with BV and yeast  Precipitating or alleviating factors: None  Therapies tried and outcome: none  Patient Active Problem List   Diagnosis    Tobacco use disorder    PCOS (polycystic ovarian syndrome)    Infertility, female    Abdominal pain    Dysmenorrhea    ASCUS on Pap smear    Pelvic pain in female    CARDIOVASCULAR SCREENING; LDL GOAL LESS THAN 160    Asthma    Controlled substance agreement signed    Conversion reaction    Depression    Dysplasia of cervix (uteri)    Pain in joint, pelvic region and thigh    Nausea & vomiting    Headache    PID (pelvic inflammatory disease)    Uterine leiomyoma    Need for Tdap vaccination    Carpal tunnel syndrome of right wrist     Current Outpatient Medications   Medication    gabapentin (NEURONTIN) 300 MG capsule    guaiFENesin-codeine (CHERATUSSIN AC) 100-10 MG/5ML solution    oxyCODONE-acetaminophen (PERCOCET) 7.5-325 MG per tablet    traZODone (DESYREL) 100 MG tablet     No current facility-administered medications for this visit.        Allergies   Allergen Reactions    Benzoyl Peroxide Swelling     Review of Systems   Constitutional:  Negative for chills, fatigue and fever.   Respiratory: Negative.  Negative for cough, chest tightness, shortness of breath and wheezing.    Cardiovascular: Negative.  Negative for chest pain, palpitations and  peripheral edema.   Gastrointestinal: Negative.  Negative for abdominal pain, constipation, diarrhea, heartburn, hematochezia, nausea and vomiting.   Genitourinary:  Positive for vaginal pain. Negative for dysuria, flank pain, frequency, hematuria, pelvic pain, urgency, vaginal bleeding and vaginal discharge.   All other systems reviewed and are negative.           Objective    /88   Pulse (!) 130   Temp 99.5  F (37.5  C) (Tympanic)   Resp 12   Wt 88.5 kg (195 lb 1.6 oz)   SpO2 98%   BMI 33.72 kg/m    Body mass index is 33.72 kg/m .  Physical Exam  Vitals and nursing note reviewed.   Constitutional:       General: She is not in acute distress.     Appearance: Normal appearance. She is well-developed and normal weight. She is not ill-appearing.   Cardiovascular:      Rate and Rhythm: Normal rate and regular rhythm.      Pulses: Normal pulses.      Heart sounds: Normal heart sounds, S1 normal and S2 normal. No murmur heard.     No friction rub. No gallop.   Pulmonary:      Effort: Pulmonary effort is normal. No accessory muscle usage or respiratory distress.      Breath sounds: Normal breath sounds and air entry. No decreased breath sounds, wheezing, rhonchi or rales.   Abdominal:      General: Abdomen is flat. Bowel sounds are normal.      Palpations: Abdomen is soft. There is no hepatomegaly, splenomegaly or mass.      Tenderness: There is no abdominal tenderness. There is no right CVA tenderness, left CVA tenderness, guarding or rebound. Negative signs include Cortez's sign, Rovsing's sign, McBurney's sign, psoas sign and obturator sign.      Hernia: No hernia is present.   Genitourinary:     Comments: Declined pelvic exam.  Skin:     General: Skin is warm and dry.   Neurological:      Mental Status: She is alert and oriented to person, place, and time.   Psychiatric:         Mood and Affect: Mood normal.         Behavior: Behavior normal.         Thought Content: Thought content normal.          Judgment: Judgment normal.       Results for orders placed or performed in visit on 10/05/23 (from the past 24 hour(s))   UA Macroscopic with reflex to Microscopic and Culture - Lab Collect    Specimen: Urine, Clean Catch   Result Value Ref Range    Color Urine Yellow Colorless, Straw, Light Yellow, Yellow    Appearance Urine Clear Clear    Glucose Urine Negative Negative mg/dL    Bilirubin Urine Small (A) Negative    Ketones Urine Trace (A) Negative mg/dL    Specific Gravity Urine >=1.030 1.003 - 1.035    Blood Urine Trace (A) Negative    pH Urine 5.0 5.0 - 7.0    Protein Albumin Urine 30 (A) Negative mg/dL    Urobilinogen Urine 0.2 0.2, 1.0 E.U./dL    Nitrite Urine Negative Negative    Leukocyte Esterase Urine Negative Negative   Wet prep - lab collect    Specimen: Vagina; Swab   Result Value Ref Range    Trichomonas Absent Absent    Yeast Present (A) Absent    Clue Cells Present (A) Absent    WBCs/high power field 2+ (A) None   UA Microscopic with Reflex to Culture   Result Value Ref Range    Bacteria Urine Many (A) None Seen /HPF    RBC Urine 0-2 0-2 /HPF /HPF    WBC Urine 0-5 0-5 /HPF /HPF    Squamous Epithelials Urine Moderate (A) None Seen /LPF    Mucus Urine Present (A) None Seen /LPF    Hyaline Casts Urine 2-5 (A) None Seen /LPF    Narrative    Urine Culture not indicated         Assessment/Plan:  BV (bacterial vaginosis):  Wet prep showed clue cells present.  Will treat with metrogel vaginal.  No ETOH while on medications due to disulfuram reaction.  Avoid foreign body insertion, scented personal care products and frequent baths.  Recheck in clinic if symptoms worsen or if symptoms do not improve.    -     UA Macroscopic with reflex to Microscopic and Culture - Lab Collect; Future  -     Wet prep - lab collect; Future  -     UA Macroscopic with reflex to Microscopic and Culture - Lab Collect  -     Wet prep - lab collect  -     UA Microscopic with Reflex to Culture  -     metroNIDAZOLE (METROGEL) 0.75 %  vaginal gel; Place 1 applicator (5 g) vaginally daily for 5 days Please cancel RX for metronidazole tablets.    Candidiasis of vagina:  Will give diflucan as directed.  -     fluconazole (DIFLUCAN) 150 MG tablet; Take 1 tablet (150 mg) by mouth every 3 days for 3 doses    Screen for STD (sexually transmitted disease):  Will check STD labs below per patient request.  -     HIV Antigen Antibody Combo [[id number]]; Future  -     Hepatitis B surface antigen [[id number]]; Future  -     Hepatitis C antibody [[id number]]; Future  -     Treponema Abs w Reflex to RPR and Titer [[id number]]; Future  -     Chlamydia trachomatis PCR [SLF550]; Future  -     Neisseria gonorrhoeae PCR [[id number]]; Future  -     HIV Antigen Antibody Combo [[id number]]  -     Hepatitis B surface antigen [FQR526]  -     Hepatitis C antibody [ROD386]  -     Treponema Abs w Reflex to RPR and Titer [[id number]]  -     Chlamydia trachomatis PCR [DQF319]  -     Neisseria gonorrhoeae PCR [[id number]]          Melyssa Soriano PA-C

## 2023-10-06 ENCOUNTER — MYC REFILL (OUTPATIENT)
Dept: FAMILY MEDICINE | Facility: CLINIC | Age: 33
End: 2023-10-06
Payer: COMMERCIAL

## 2023-10-06 DIAGNOSIS — R10.32 LLQ ABDOMINAL PAIN: ICD-10-CM

## 2023-10-06 LAB
C TRACH DNA SPEC QL NAA+PROBE: NEGATIVE
HBV SURFACE AG SERPL QL IA: NONREACTIVE
HCV AB SERPL QL IA: NONREACTIVE
HIV 1+2 AB+HIV1 P24 AG SERPL QL IA: NONREACTIVE
N GONORRHOEA DNA SPEC QL NAA+PROBE: NEGATIVE
T PALLIDUM AB SER QL: NONREACTIVE

## 2023-10-09 RX ORDER — OXYCODONE AND ACETAMINOPHEN 7.5; 325 MG/1; MG/1
1 TABLET ORAL 3 TIMES DAILY PRN
Qty: 12 TABLET | Refills: 0 | Status: SHIPPED | OUTPATIENT
Start: 2023-10-09

## 2023-11-07 ENCOUNTER — MYC REFILL (OUTPATIENT)
Dept: FAMILY MEDICINE | Facility: CLINIC | Age: 33
End: 2023-11-07
Payer: COMMERCIAL

## 2023-11-07 DIAGNOSIS — R10.32 LLQ ABDOMINAL PAIN: ICD-10-CM

## 2023-11-07 RX ORDER — OXYCODONE AND ACETAMINOPHEN 7.5; 325 MG/1; MG/1
1 TABLET ORAL 3 TIMES DAILY PRN
Qty: 12 TABLET | Refills: 0 | OUTPATIENT
Start: 2023-11-07

## 2023-11-07 NOTE — TELEPHONE ENCOUNTER
Refill denied.  A visit is required every 3 months for ongoing pain medicine.  Last seen in June.  She did have a visit scheduled with me 10/12 but she no showed

## 2023-12-05 DIAGNOSIS — G47.00 INSOMNIA, UNSPECIFIED TYPE: ICD-10-CM

## 2023-12-05 RX ORDER — TRAZODONE HYDROCHLORIDE 100 MG/1
TABLET ORAL
Qty: 90 TABLET | Refills: 1 | Status: SHIPPED | OUTPATIENT
Start: 2023-12-05

## 2024-06-29 ENCOUNTER — HEALTH MAINTENANCE LETTER (OUTPATIENT)
Age: 34
End: 2024-06-29

## 2025-07-13 ENCOUNTER — HEALTH MAINTENANCE LETTER (OUTPATIENT)
Age: 35
End: 2025-07-13

## (undated) DEVICE — SU ETHILON 4-0 FS-2 18" 662H

## (undated) DEVICE — DRAPE STERI TOWEL SM 1000

## (undated) DEVICE — GLOVE PROTEXIS W/NEU-THERA 7.5  2D73TE75

## (undated) DEVICE — CAST PLASTER SPLINT 4X15" 7394

## (undated) DEVICE — PACK HAND WRIST SOP15HWFSP

## (undated) DEVICE — PREP CHLORAPREP 26ML TINTED ORANGE  260815

## (undated) DEVICE — GLOVE PROTEXIS W/NEU-THERA 8.0  2D73TE80

## (undated) DEVICE — ESU PENCIL SMOKE EVAC W/ROCKER SWITCH 0703-047-000

## (undated) DEVICE — SOL WATER IRRIG 1000ML BOTTLE 07139-09

## (undated) DEVICE — BNDG ELASTIC 3"X5YDS UNSTERILE 6611-30

## (undated) DEVICE — CAST PADDING 4" UNSTERILE 9044

## (undated) DEVICE — ESU ELEC NDL 1" COATED/INSULATED E1465

## (undated) DEVICE — NDL 19GA 1.5"

## (undated) RX ORDER — FENTANYL CITRATE 50 UG/ML
INJECTION, SOLUTION INTRAMUSCULAR; INTRAVENOUS
Status: DISPENSED
Start: 2022-04-06

## (undated) RX ORDER — OXYCODONE HYDROCHLORIDE 5 MG/1
TABLET ORAL
Status: DISPENSED
Start: 2022-04-06

## (undated) RX ORDER — CEFAZOLIN SODIUM 1 G/3ML
INJECTION, POWDER, FOR SOLUTION INTRAMUSCULAR; INTRAVENOUS
Status: DISPENSED
Start: 2022-04-06

## (undated) RX ORDER — LIDOCAINE HYDROCHLORIDE AND EPINEPHRINE 10; 10 MG/ML; UG/ML
INJECTION, SOLUTION INFILTRATION; PERINEURAL
Status: DISPENSED
Start: 2022-04-06

## (undated) RX ORDER — ACETAMINOPHEN 325 MG/1
TABLET ORAL
Status: DISPENSED
Start: 2022-04-06

## (undated) RX ORDER — CELECOXIB 200 MG/1
CAPSULE ORAL
Status: DISPENSED
Start: 2022-04-06